# Patient Record
Sex: FEMALE | Race: WHITE | Employment: UNEMPLOYED | URBAN - METROPOLITAN AREA
[De-identification: names, ages, dates, MRNs, and addresses within clinical notes are randomized per-mention and may not be internally consistent; named-entity substitution may affect disease eponyms.]

---

## 2017-04-11 DIAGNOSIS — C50.419 MALIGNANT NEOPLASM OF UPPER-OUTER QUADRANT OF FEMALE BREAST, UNSPECIFIED LATERALITY: ICD-10-CM

## 2017-04-11 RX ORDER — VENLAFAXINE HYDROCHLORIDE 37.5 MG/1
37.5 CAPSULE, EXTENDED RELEASE ORAL DAILY
Qty: 90 CAPSULE | Refills: 3 | Status: SHIPPED | OUTPATIENT
Start: 2017-04-11 | End: 2017-04-24

## 2017-04-17 DIAGNOSIS — Z85.3 PERSONAL HISTORY OF MALIGNANT NEOPLASM OF BREAST: ICD-10-CM

## 2017-04-17 LAB
BASOPHILS # BLD AUTO: 0 10E9/L (ref 0–0.2)
BASOPHILS NFR BLD AUTO: 0 %
DIFFERENTIAL METHOD BLD: ABNORMAL
EOSINOPHIL # BLD AUTO: 0.2 10E9/L (ref 0–0.7)
EOSINOPHIL NFR BLD AUTO: 6 %
ERYTHROCYTE [DISTWIDTH] IN BLOOD BY AUTOMATED COUNT: 14 % (ref 10–15)
HCT VFR BLD AUTO: 39.2 % (ref 35–47)
HGB BLD-MCNC: 12.9 G/DL (ref 11.7–15.7)
LYMPHOCYTES # BLD AUTO: 0.9 10E9/L (ref 0.8–5.3)
LYMPHOCYTES NFR BLD AUTO: 24.4 %
MCH RBC QN AUTO: 30.1 PG (ref 26.5–33)
MCHC RBC AUTO-ENTMCNC: 32.9 G/DL (ref 31.5–36.5)
MCV RBC AUTO: 92 FL (ref 78–100)
MONOCYTES # BLD AUTO: 0.4 10E9/L (ref 0–1.3)
MONOCYTES NFR BLD AUTO: 10 %
NEUTROPHILS # BLD AUTO: 2.3 10E9/L (ref 1.6–8.3)
NEUTROPHILS NFR BLD AUTO: 59.6 %
PLATELET # BLD AUTO: 178 10E9/L (ref 150–450)
RBC # BLD AUTO: 4.28 10E12/L (ref 3.8–5.2)
WBC # BLD AUTO: 3.8 10E9/L (ref 4–11)

## 2017-04-17 PROCEDURE — 85025 COMPLETE CBC W/AUTO DIFF WBC: CPT | Performed by: INTERNAL MEDICINE

## 2017-04-17 PROCEDURE — 86300 IMMUNOASSAY TUMOR CA 15-3: CPT | Performed by: INTERNAL MEDICINE

## 2017-04-17 PROCEDURE — 36415 COLL VENOUS BLD VENIPUNCTURE: CPT | Performed by: INTERNAL MEDICINE

## 2017-04-17 PROCEDURE — 80076 HEPATIC FUNCTION PANEL: CPT | Performed by: INTERNAL MEDICINE

## 2017-04-18 LAB
ALBUMIN SERPL-MCNC: 4 G/DL (ref 3.4–5)
ALP SERPL-CCNC: 83 U/L (ref 40–150)
ALT SERPL W P-5'-P-CCNC: 40 U/L (ref 0–50)
AST SERPL W P-5'-P-CCNC: 25 U/L (ref 0–45)
BILIRUB DIRECT SERPL-MCNC: <0.1 MG/DL (ref 0–0.2)
BILIRUB SERPL-MCNC: 0.4 MG/DL (ref 0.2–1.3)
CANCER AG27-29 SERPL-ACNC: 22 U/ML (ref 0–39)
PROT SERPL-MCNC: 7.6 G/DL (ref 6.8–8.8)

## 2017-04-24 ENCOUNTER — ONCOLOGY VISIT (OUTPATIENT)
Dept: ONCOLOGY | Facility: CLINIC | Age: 47
End: 2017-04-24
Attending: INTERNAL MEDICINE
Payer: COMMERCIAL

## 2017-04-24 ENCOUNTER — MYC MEDICAL ADVICE (OUTPATIENT)
Dept: ONCOLOGY | Facility: CLINIC | Age: 47
End: 2017-04-24

## 2017-04-24 VITALS
HEIGHT: 65 IN | DIASTOLIC BLOOD PRESSURE: 75 MMHG | HEART RATE: 69 BPM | SYSTOLIC BLOOD PRESSURE: 134 MMHG | BODY MASS INDEX: 28.32 KG/M2 | TEMPERATURE: 97.3 F | OXYGEN SATURATION: 100 % | WEIGHT: 170 LBS

## 2017-04-24 DIAGNOSIS — Z85.3 PERSONAL HISTORY OF MALIGNANT NEOPLASM OF BREAST: ICD-10-CM

## 2017-04-24 DIAGNOSIS — D72.819 LEUKOPENIA, UNSPECIFIED TYPE: ICD-10-CM

## 2017-04-24 DIAGNOSIS — K76.0 FATTY LIVER: ICD-10-CM

## 2017-04-24 DIAGNOSIS — N95.1 HOT FLUSHES, PERIMENOPAUSAL: Primary | ICD-10-CM

## 2017-04-24 DIAGNOSIS — R42 DIZZINESS: ICD-10-CM

## 2017-04-24 DIAGNOSIS — C50.419 MALIGNANT NEOPLASM OF UPPER-OUTER QUADRANT OF FEMALE BREAST, UNSPECIFIED LATERALITY: ICD-10-CM

## 2017-04-24 PROCEDURE — 99215 OFFICE O/P EST HI 40 MIN: CPT | Performed by: INTERNAL MEDICINE

## 2017-04-24 PROCEDURE — 99211 OFF/OP EST MAY X REQ PHY/QHP: CPT

## 2017-04-24 RX ORDER — THYROID,PORK 15 MG
TABLET ORAL
Refills: 2 | COMMUNITY
Start: 2017-02-14 | End: 2018-10-15 | Stop reason: DRUGHIGH

## 2017-04-24 RX ORDER — ANASTROZOLE 1 MG/1
1 TABLET ORAL DAILY
Qty: 90 TABLET | Refills: 3 | Status: CANCELLED | OUTPATIENT
Start: 2017-04-24

## 2017-04-24 RX ORDER — VENLAFAXINE HYDROCHLORIDE 37.5 MG/1
75 CAPSULE, EXTENDED RELEASE ORAL DAILY
Qty: 60 CAPSULE | Refills: 1 | Status: SHIPPED | OUTPATIENT
Start: 2017-04-24 | End: 2017-10-16

## 2017-04-24 ASSESSMENT — PAIN SCALES - GENERAL: PAINLEVEL: NO PAIN (0)

## 2017-04-24 NOTE — PATIENT INSTRUCTIONS
Dr. Perry is referring you to ENT for your new onset of dizziness and recommends that you call us in 2 weeks regarding your symptoms and regarding scheduling a follow up. She also recommends that you complete your mammogram in July.     When you are in need of a refill, please call your pharmacy and they will send us a request.  Copy of appointments, and after visit summary (AVS) given to patient.  If you have any questions please call Sasha Gibson RN, BSN, OCN Oncology Hematology  [facility] [phone]. For questions after business hours, or on holidays/weekends, please call our after hours Nurse Triage line [phone]. Thank you.

## 2017-04-24 NOTE — TELEPHONE ENCOUNTER
Called pt regarding her MyChart question regarding stopping the effexor to see if her dizziness improves instead of increasing the dose. Told Pt that was ok per Dr. Perry but that she recommends that she wean off of it and instructed her to take it once a day for 3 days instead of daily before stopping it completely. Pt verbalized understanding and agreed with the plan.

## 2017-04-24 NOTE — MR AVS SNAPSHOT
After Visit Summary   4/24/2017    Lisset Lopes    MRN: [id number]           Patient Information     Date Of Birth          1970        Visit Information        Provider Department      4/24/2017 11:30 AM Clara Perry MD [facility]        Today's Diagnoses     Hot flushes, perimenopausal    -  1    Personal history of malignant neoplasm of breast        Fatty liver        Leukopenia, unspecified type        Dizziness        Malignant neoplasm of upper-outer quadrant of female breast, unspecified laterality (H)          Care Instructions    Dr. Perry is referring you to ENT for your new onset of dizziness and recommends that you call us in 2 weeks regarding your symptoms and regarding scheduling a follow up. She also recommends that you complete your mammogram in July.     When you are in need of a refill, please call your pharmacy and they will send us a request.  Copy of appointments, and after visit summary (AVS) given to patient.  If you have any questions please call Sasha Gibson RN, BSN, OCN Oncology Hematology  [facility] [phone]. For questions after business hours, or on holidays/weekends, please call our after hours Nurse Triage line [phone]. Thank you.           Follow-ups after your visit        Your next 10 appointments already scheduled     Jul 27, 2017 11:15 AM CDT   MA SCREENING BILATERAL W/ CINTHYA with [facility]   [facility] ([facility])    [address] [address] [phone]           Do not use any powder, lotion or deodorant under your arms or on your breast. If you do, we will ask you to remove it before your exam.  Wear comfortable, two-piece clothing.  If you have any allergies, tell your care team.  Bring any previous mammograms from other facilities or have them mailed to the breast center.              Future tests that were ordered for you today     Open Future  "Orders        Priority Expected Expires Ordered    MA Screen Bilateral w/Wes Routine 7/1/2017 4/24/2018 4/24/2017            Who to contact     If you have questions or need follow up information about today's clinic visit or your schedule please contact [facility] directly at [phone].  Normal or non-critical lab and imaging results will be communicated to you by MyChart, letter or phone within 4 business days after the clinic has received the results. If you do not hear from us within 7 days, please contact the clinic through [employer] or phone. If you have a critical or abnormal lab result, we will notify you by phone as soon as possible.  Submit refill requests through [employer] or call your pharmacy and they will forward the refill request to us. Please allow 3 business days for your refill to be completed.          Additional Information About Your Visit        MyChart Information     [employer] gives you secure access to your electronic health record. If you see a primary care provider, you can also send messages to your care team and make appointments. If you have questions, please call your primary care clinic.  If you do not have a primary care provider, please call [phone] and they will assist you.        Care EveryWhere ID     This is your Care EveryWhere ID. This could be used by other organizations to access your [place] medical records  [id number]        Your Vitals Were     Pulse Temperature Height Pulse Oximetry Breastfeeding? BMI (Body Mass Index)    69 97.3  F (36.3  C) (Tympanic) 1.657 m (5' 5.25\") 100% No 28.07 kg/m2       Blood Pressure from Last 3 Encounters:   04/24/17 134/75   10/10/16 130/89   04/07/16 118/70    Weight from Last 3 Encounters:   04/24/17 77.1 kg (170 lb)   10/10/16 73.1 kg (161 lb 1.6 oz)   04/07/16 71.5 kg (157 lb 9.6 oz)                 Today's Medication Changes          These changes are accurate as of: 4/24/17 12:09 PM.  If you have any questions, ask " your nurse or doctor.               These medicines have changed or have updated prescriptions.        Dose/Directions    venlafaxine 37.5 MG 24 hr capsule   Commonly known as:  EFFEXOR-XR   This may have changed:  how much to take   Used for:  Malignant neoplasm of upper-outer quadrant of female breast, unspecified laterality (H)   Changed by:  Clara Perry MD        Dose:  75 mg   Take 2 capsules (75 mg) by mouth daily   Quantity:  60 capsule   Refills:  1            Where to get your medicines      These medications were sent to [id number] - [place], [place] - [address]  [address] [place] [address]     Phone:  [phone]     venlafaxine 37.5 MG 24 hr capsule                Primary Care Provider Office Phone # Fax #    Estevan Reynoso [phone]       [facility] [address] [place] [address]        Thank you!     Thank you for choosing [facility]  for your care. Our goal is always to provide you with excellent care. Hearing back from our patients is one way we can continue to improve our services. Please take a few minutes to complete the written survey that you may receive in the mail after your visit with us. Thank you!             Your Updated Medication List - Protect others around you: Learn how to safely use, store and throw away your medicines at www.disposemymeds.org.          This list is accurate as of: 4/24/17 12:09 PM.  Always use your most recent med list.                   Brand Name Dispense Instructions for use    anastrozole 1 MG tablet    ARIMIDEX    90 tablet    Take 1 tablet (1 mg) by mouth daily       ARMOUR THYROID 15 MG Tabs tablet   Generic drug:  thyroid      TAKE 1 TABLET BY MOUTH ONE TIME DAILY ON AN EMPTY STOMACH       venlafaxine 37.5 MG 24 hr capsule    EFFEXOR-XR    60 capsule    Take 2 capsules (75 mg) by mouth daily

## 2017-04-24 NOTE — NURSING NOTE
"Lisset Lopes is a 46 year old female who presents for:  Chief Complaint   Patient presents with     Oncology Clinic Visit     6 month recheck Breast CA, review labs        Initial Vitals:  /75 (BP Location: Right arm, Patient Position: Chair, Cuff Size: Adult Regular)  Pulse 69  Temp 97.3  F (36.3  C) (Tympanic)  Ht 1.657 m (5' 5.25\")  Wt 77.1 kg (170 lb)  SpO2 100%  Breastfeeding? No  BMI 28.07 kg/m2 Estimated body mass index is 28.07 kg/(m^2) as calculated from the following:    Height as of this encounter: 1.657 m (5' 5.25\").    Weight as of this encounter: 77.1 kg (170 lb).. Body surface area is 1.88 meters squared. BP completed using cuff size: regular  No Pain (0) No LMP recorded. Patient is not currently having periods (Reason: Irregular Periods). Allergies and medications reviewed.     Medications: Medication refills not needed today.  Pharmacy name entered into EPIC: [id number] - [place], [place] - [address]    Comments: 6 month recheck Breast CA, review labs.     7  minutes for nursing intake (face to face time)   Jes Wright CMA        "

## 2017-04-24 NOTE — PROGRESS NOTES
CC: left breast cancer 2013 s/p lumpectomy, DD AC and wkly taxol, s/p RT     HISTORY OF PRESENT ILLNESS:  She presented at 43 MA 07/2013, identified heterogeneous breast and spiculated appearing mass + microcalcification.  She had a workup at [facility] initially.  Biopsy was done 08/2013, identified at 1 o'clock left breast, 2 cm from nipple fibroadenoma, 1 o'clock 10 cm from the nipple, invasive ductal cancer, grade 1, ER/MI 75% to 90% positive, HER-2/jose juan was negative, associated with DCIS, grade 1, without necrosis.    She subsequently went to [facility] to get surgical opinion from Dr. Kiran Kim and then went down to [facility].  Had a lumpectomy down there 10/03/2013, identified multifocal invasive ductal carcinoma with tubular features predominantly low nuclear grade 1.  The largest focus of invasive cancer 2.4 cm and second focus measured 1.4 cm, DCIS, low-grade, cribriform and micropapillary features, without necrosis.  Axillary lymph nodes, sentinel nodes #1 positive, the tumor focus measured 5 mm and has extranodal extension measures less than 1 mm.  Left axillary sentinel nodes #2 was negative.  Margin was negative.  She has pathologic T2 N1a invasive ductal cancer, multifocal disease, left breast cancer.   Staging PET has noise at surgical bed.   Oncotype RS is 22.     She made informed decision to proceed with DD AC then wkly taxol. C1D1 DD AC 11/26/2013. She went down to [place] and continued on the tx. She finished all the chemo in 4/2014.    She finished RT 7/2014. Tamoxifen is started then. LMP 12/2013. Tamoxifen is changed to Arimidex 4/2016.      PAST MEDICAL HISTORY:  Hypothyroidism off Synthroid due to poor tolerance.   PE in RLL and distal LLL in 1/2014 in [place] while on chemo s/p 6 months of coumadin till 7/2014.     FAMILY HISTORY:  Not significant for any breast cancer or any malignancy.      SOCIAL HISTORY:  She is .  She just opened a fitness center.  She has never  "been pregnant. Denies smoking.  She likes to drink, not every day.      REVIEW OF SYSTEMS:    Hot flushes did not response to clonidine, acupuncture., slightly better with Effexor 37.5 mg    More fatigue lately.   Reports new onset of dizziness in the last 3 wks, not position related. She claims she had ear problem before.     PHYSICAL EXAMINATION   VITAL SIGNS:  Blood pressure 134/75, pulse 69, temperature 97.3  F (36.3  C), temperature source Tympanic, height 1.657 m (5' 5.25\"), weight 77.1 kg (170 lb), SpO2 100 %, not currently breastfeeding.  GENERAL APPEARANCE:  A young woman, looks like her stated age, not in acute distress.     HEENT: The patient is normocephalic, atraumatic. Pupils are equal react to light.  Sclerae are anicteric.  Moist oral mucosa.  Negative pharynx.  No oral thrush.   NECK:  Supple.  No jugular venous distention.  Thyroid is not palpable.   LYMPH NODES:  Superficial lymphadenopathy is not appreciable in the bilateral cervical, supraclavicular, axillary or inguinal adenopathy.   CARDIOVASCULAR:  S1, S2 regular with no murmurs or gallops.  No carotid or abdominal bruits. Port site is clear.  PULMONARY:  Lungs are clear to auscultation and percussion bilaterally.  There is no wheezing or rhonchi.   GASTROINTESTINAL:  Abdomen is soft, nontender.  No hepatosplenomegaly.  No signs of ascites.  No mass appreciable.   MUSCULOSKELETAL/EXTREMITIES:  No edema.  No cyanotic changes.  No signs of joint deformity.  No lymphedema.   NEUROLOGIC:  Cranial nerves II-XII are grossly intact.  Sensation intact.  Muscle strength and muscle tone symmetrical all through 5/5.   BACK:  No spinal or paraspinal tenderness.  No CVA tenderness.   SKIN:  No petechiae.  No rash.  No signs of cellulitis.   BREASTS:  Left breast has post RT changes. Scars are smooth.  Left nipple is retracted.  Right breast revealed no skin changes, no palpable lesions. She has chronic bilateral nipple inversion.       CURRENT LAB DATA:  "   WBC 3.8 from  3.2 from 3.4 from 2.8, anc nl, LFT/marker are good.      CURRENT IMAGE  MA dx b/l 7/2016: negative    Old data reviewed with summary  CT BODY 4/2015:  1. No evidence of mets  2. sclerotic foci are unchanged dating back to 11/12/2013 which is supportive evidence for a benign entity.  3. Stable AMEYA pulmonary nodule stable, stable liver lesion and left adrenal changes consistent with benign etiology.   4. Fatty liver    CT 7/2014 - clearing of PE.  CT in [place] 1/2014 - PE in RLL and more distal LLL with subsegmental atelectasis, ? Pulmonary infarct.    PET 4/2014 - negative for activity.     MUGA 11/20-13 - EF 71%.   Thyroid sono 11/2013 - There is asymmetric enlargement of the right lobe of the gland which is heterogeneous and hyperemic. This can relate to an asymmetric goiter and/or thyroiditis. This accounts for the somewhat diffuse uptake in the right lobe of the thyroid gland on the recent PET/CT. More specific attention to the upper pole of the right lobe where slightly more focal activity is seen on the PET/CT shows no discrete nodule. There is an incidental small nodule in the lower pole of the right lobe of the thyroid gland with no convincing separate standout uptake on the PET/CT.     Oncotype dx RS 22, correspond to 10 yr distal rate of recurrence of 14% if only Tamoxifen , RT.    BRCA1 and BRCA2 test was negative from [facility] in 09/2013.       ASSESSMENT AND PLAN:    1. pathologic T2 N1a left breast cancer dx in premenopausally at age 43 years old in 2013.  She had T2N1a stage II disease. RS at 22.      She was offered DD AC with wkly or DD taxol. She made informed decision to proceed with DD AC and wkly taxol from 11/2013 to 4/2014.    She finished RT 7/2014. And started on Tamoxifen summer 2014. LMP 12/2013.  Tamoxifen is started then. LMP 12/2013. Tamoxifen is changed to Arimidex 4/2016.     I shared with her the 10 yr data of anti hormone thearpy. She will benefit from  longer anti hormone therapy.      She is due 6 months f/u with labs.  MA 3D is due in July.     2. Hot flushes. She tried clodinine patch, it did not work. Acupuncture did not work either.   Effexor 37.5 mg seems to be helpful. Yet this drug has s/e listed as 25% dizziness.   If by increasing the dose, her new dizziness is worse, then likely is the mde side effects.    Or she has to hold it and bare more hot flushes and see if that improves her dizziness.     3. Leukopenia. It is likely from previous chemo.  This needs on going monitoring. It is improving.     4. new onset of dizziness in the last 3 wks, not position related. She claims she had ear problem before.   Effexor has this side effects up to 25%, Arimidex has it too up to 8%.  Recommned brain MRI as work up, she declines it for now. She would like to see ENT first locally.

## 2017-05-11 ENCOUNTER — TELEPHONE (OUTPATIENT)
Dept: ONCOLOGY | Facility: CLINIC | Age: 47
End: 2017-05-11

## 2017-05-11 DIAGNOSIS — C50.419 MALIGNANT NEOPLASM OF UPPER-OUTER QUADRANT OF FEMALE BREAST (H): Primary | ICD-10-CM

## 2017-05-11 NOTE — TELEPHONE ENCOUNTER
Called and left  for Pt to return my call for a status check regarding her symptoms discussed in Clinic recently with Dr. Perry. Left my direct line and waiting on call back.

## 2017-05-16 NOTE — TELEPHONE ENCOUNTER
Pt returned my call stating that she stopped her effexor as advised and was recently seen and evaluated by her local ENT doctor in [facility] by the name of Dr. Abhi Duff. Per Pt, Dr. Duff was unable to determine what was causing her dizziness and attributed it to vertigo stating that it was up to Dr. Perry if she wanted to do any further work up.     Per pt her dizziness has subsided since seeing us last and stopping the effexor. Pt is now wondering if Dr. Perry has any further recommendations or if she would just like to see her at her routine f/u. Told Pt that I would speak to Dr. Perry regarding this and get back to her within the next few days. Pt verbalized understanding and agreed with the plan.

## 2017-05-16 NOTE — TELEPHONE ENCOUNTER
Called pt back to let her know that Dr. Perry recommends that she f/u in 6 months with labs prior. Also informed her per Dr. Perry that if the effexor was helping her hot flashes that she could try to take one 37.5 mg tablet every other day to see if it helps without causing dizziness or advised that she try apple cider vinegar. Pt states that she prefers not to take the effexor at this time but might resume the apple cider vinegar which she stated that she has tried in the past. Pt verbalized understanding and call transferred to the  to schedule her appts.

## 2017-07-27 ENCOUNTER — HOSPITAL ENCOUNTER (OUTPATIENT)
Dept: MAMMOGRAPHY | Facility: CLINIC | Age: 47
Discharge: HOME | End: 2017-07-27
Attending: INTERNAL MEDICINE | Admitting: INTERNAL MEDICINE
Payer: COMMERCIAL

## 2017-07-27 DIAGNOSIS — Z85.3 PERSONAL HISTORY OF MALIGNANT NEOPLASM OF BREAST: ICD-10-CM

## 2017-07-27 PROCEDURE — 77063 BREAST TOMOSYNTHESIS BI: CPT

## 2017-07-27 PROCEDURE — G0202 SCR MAMMO BI INCL CAD: HCPCS

## 2017-10-09 DIAGNOSIS — C50.419 MALIGNANT NEOPLASM OF UPPER-OUTER QUADRANT OF FEMALE BREAST (H): ICD-10-CM

## 2017-10-09 LAB
BASOPHILS # BLD AUTO: 0 10E9/L (ref 0–0.2)
BASOPHILS NFR BLD AUTO: 0.2 %
DIFFERENTIAL METHOD BLD: NORMAL
EOSINOPHIL # BLD AUTO: 0.1 10E9/L (ref 0–0.7)
EOSINOPHIL NFR BLD AUTO: 1.6 %
ERYTHROCYTE [DISTWIDTH] IN BLOOD BY AUTOMATED COUNT: 14 % (ref 10–15)
HCT VFR BLD AUTO: 41.6 % (ref 35–47)
HGB BLD-MCNC: 13.7 G/DL (ref 11.7–15.7)
LYMPHOCYTES # BLD AUTO: 1 10E9/L (ref 0.8–5.3)
LYMPHOCYTES NFR BLD AUTO: 23.9 %
MCH RBC QN AUTO: 29.8 PG (ref 26.5–33)
MCHC RBC AUTO-ENTMCNC: 32.9 G/DL (ref 31.5–36.5)
MCV RBC AUTO: 91 FL (ref 78–100)
MONOCYTES # BLD AUTO: 0.4 10E9/L (ref 0–1.3)
MONOCYTES NFR BLD AUTO: 10.1 %
NEUTROPHILS # BLD AUTO: 2.7 10E9/L (ref 1.6–8.3)
NEUTROPHILS NFR BLD AUTO: 64.2 %
PLATELET # BLD AUTO: 212 10E9/L (ref 150–450)
RBC # BLD AUTO: 4.59 10E12/L (ref 3.8–5.2)
WBC # BLD AUTO: 4.3 10E9/L (ref 4–11)

## 2017-10-09 PROCEDURE — 80076 HEPATIC FUNCTION PANEL: CPT | Performed by: INTERNAL MEDICINE

## 2017-10-09 PROCEDURE — 85025 COMPLETE CBC W/AUTO DIFF WBC: CPT | Performed by: INTERNAL MEDICINE

## 2017-10-09 PROCEDURE — 86300 IMMUNOASSAY TUMOR CA 15-3: CPT | Performed by: INTERNAL MEDICINE

## 2017-10-09 PROCEDURE — 36415 COLL VENOUS BLD VENIPUNCTURE: CPT | Performed by: INTERNAL MEDICINE

## 2017-10-10 LAB
ALBUMIN SERPL-MCNC: 4 G/DL (ref 3.4–5)
ALP SERPL-CCNC: 91 U/L (ref 40–150)
ALT SERPL W P-5'-P-CCNC: 38 U/L (ref 0–50)
AST SERPL W P-5'-P-CCNC: 18 U/L (ref 0–45)
BILIRUB DIRECT SERPL-MCNC: <0.1 MG/DL (ref 0–0.2)
BILIRUB SERPL-MCNC: 0.6 MG/DL (ref 0.2–1.3)
CANCER AG27-29 SERPL-ACNC: 19 U/ML (ref 0–39)
PROT SERPL-MCNC: 7.4 G/DL (ref 6.8–8.8)

## 2017-10-16 ENCOUNTER — ONCOLOGY VISIT (OUTPATIENT)
Dept: ONCOLOGY | Facility: CLINIC | Age: 47
End: 2017-10-16
Attending: INTERNAL MEDICINE
Payer: COMMERCIAL

## 2017-10-16 VITALS
DIASTOLIC BLOOD PRESSURE: 85 MMHG | WEIGHT: 170.4 LBS | TEMPERATURE: 97.8 F | SYSTOLIC BLOOD PRESSURE: 145 MMHG | HEIGHT: 65 IN | RESPIRATION RATE: 14 BRPM | HEART RATE: 76 BPM | BODY MASS INDEX: 28.39 KG/M2

## 2017-10-16 DIAGNOSIS — Z17.0 MALIGNANT NEOPLASM OF UPPER-OUTER QUADRANT OF LEFT BREAST IN FEMALE, ESTROGEN RECEPTOR POSITIVE (H): Primary | ICD-10-CM

## 2017-10-16 DIAGNOSIS — N95.1 HOT FLUSHES, PERIMENOPAUSAL: ICD-10-CM

## 2017-10-16 DIAGNOSIS — M85.80 OSTEOPENIA, UNSPECIFIED LOCATION: ICD-10-CM

## 2017-10-16 DIAGNOSIS — Z78.0 MENOPAUSE: ICD-10-CM

## 2017-10-16 DIAGNOSIS — C50.412 MALIGNANT NEOPLASM OF UPPER-OUTER QUADRANT OF LEFT BREAST IN FEMALE, ESTROGEN RECEPTOR POSITIVE (H): Primary | ICD-10-CM

## 2017-10-16 PROCEDURE — 99211 OFF/OP EST MAY X REQ PHY/QHP: CPT

## 2017-10-16 PROCEDURE — 99214 OFFICE O/P EST MOD 30 MIN: CPT | Performed by: INTERNAL MEDICINE

## 2017-10-16 RX ORDER — THYROID 60 MG
60 TABLET ORAL DAILY
Refills: 2 | COMMUNITY
Start: 2017-09-20

## 2017-10-16 ASSESSMENT — PAIN SCALES - GENERAL: PAINLEVEL: NO PAIN (0)

## 2017-10-16 NOTE — PROGRESS NOTES
CC: left breast cancer 2013 s/p lumpectomy, DD AC and wkly taxol, s/p RT     HISTORY OF PRESENT ILLNESS:  She presented at 43 MA 07/2013, identified heterogeneous breast and spiculated appearing mass + microcalcification.  She had a workup at [facility] initially.  Biopsy was done 08/2013, identified at 1 o'clock left breast, 2 cm from nipple fibroadenoma, 1 o'clock 10 cm from the nipple, invasive ductal cancer, grade 1, ER/DC 75% to 90% positive, HER-2/jose juan was negative, associated with DCIS, grade 1, without necrosis.    She subsequently went to [facility] to get surgical opinion from Dr. Kiran Kim and then went down to [facility].  Had a lumpectomy down there 10/03/2013, identified multifocal invasive ductal carcinoma with tubular features predominantly low nuclear grade 1.  The largest focus of invasive cancer 2.4 cm and second focus measured 1.4 cm, DCIS, low-grade, cribriform and micropapillary features, without necrosis.  Axillary lymph nodes, sentinel nodes #1 positive, the tumor focus measured 5 mm and has extranodal extension measures less than 1 mm.  Left axillary sentinel nodes #2 was negative.  Margin was negative.  She has pathologic T2 N1a invasive ductal cancer, multifocal disease, left breast cancer.   Staging PET has noise at surgical bed.   Oncotype RS is 22.     She made informed decision to proceed with DD AC then wkly taxol. C1D1 DD AC 11/26/2013. She went down to [place] and continued on the tx. She finished all the chemo in 4/2014.    She finished RT 7/2014. Tamoxifen is started then. LMP 12/2013. Tamoxifen is changed to Arimidex 4/2016.      PAST MEDICAL HISTORY:  Hypothyroidism off Synthroid due to poor tolerance.   PE in RLL and distal LLL in 1/2014 in [place] while on chemo s/p 6 months of coumadin till 7/2014.     FAMILY HISTORY:  Not significant for any breast cancer or any malignancy.      SOCIAL HISTORY:  She is .  She just opened a fitness center.  She has never  "been pregnant. Denies smoking.  She likes to drink, not every day.      REVIEW OF SYSTEMS:    Still has hot flushes. Dizziness is gone after stopping Effexor.   She had 3 episodes of rash in summer.   She is tolerating AI ok.       PHYSICAL EXAMINATION   VITAL SIGNS:  Blood pressure 145/85, pulse 76, temperature 97.8  F (36.6  C), temperature source Tympanic, resp. rate 14, height 1.651 m (5' 5\"), weight 77.3 kg (170 lb 6.4 oz), not currently breastfeeding.  GENERAL APPEARANCE:  A young woman, looks like her stated age, not in acute distress.     HEENT: The patient is normocephalic, atraumatic. Pupils are equal react to light.  Sclerae are anicteric.  Moist oral mucosa.  Negative pharynx.  No oral thrush.   NECK:  Supple.  No jugular venous distention.  Thyroid is not palpable.   LYMPH NODES:  Superficial lymphadenopathy is not appreciable in the bilateral cervical, supraclavicular, axillary or inguinal adenopathy.   CARDIOVASCULAR:  S1, S2 regular with no murmurs or gallops.  No carotid or abdominal bruits. Port site is clear.  PULMONARY:  Lungs are clear to auscultation and percussion bilaterally.  There is no wheezing or rhonchi.   GASTROINTESTINAL:  Abdomen is soft, nontender.  No hepatosplenomegaly.  No signs of ascites.  No mass appreciable.   MUSCULOSKELETAL/EXTREMITIES:  No edema.  No cyanotic changes.  No signs of joint deformity.  No lymphedema.   NEUROLOGIC:  Cranial nerves II-XII are grossly intact.  Sensation intact.  Muscle strength and muscle tone symmetrical all through 5/5.   BACK:  No spinal or paraspinal tenderness.  No CVA tenderness.   SKIN:  No petechiae.  No rash.  No signs of cellulitis.   BREASTS:  Left breast has post RT changes. Scars are smooth.  Left nipple is retracted.  Right breast revealed no skin changes, no palpable lesions. She has chronic bilateral nipple inversion.       CURRENT LAB DATAREVIWED   WBC NL from 3.8 from  3.2 from 3.4 from 2.8, anc nl,   LFT/marker are good.  "     CURRENT IMAGE REVIEWED  MA dx b/l 7/2017: negative    Old data reviewed with summary  CT BODY 4/2015:  1. No evidence of mets  2. sclerotic foci are unchanged dating back to 11/12/2013 which is supportive evidence for a benign entity.  3. Stable AMEYA pulmonary nodule stable, stable liver lesion and left adrenal changes consistent with benign etiology.   4. Fatty liver    CT 7/2014 - clearing of PE.  CT in [place] 1/2014 - PE in RLL and more distal LLL with subsegmental atelectasis, ? Pulmonary infarct.    PET 4/2014 - negative for activity.     MUGA 11/20-13 - EF 71%.   Thyroid sono 11/2013 - There is asymmetric enlargement of the right lobe of the gland which is heterogeneous and hyperemic. This can relate to an asymmetric goiter and/or thyroiditis. This accounts for the somewhat diffuse uptake in the right lobe of the thyroid gland on the recent PET/CT. More specific attention to the upper pole of the right lobe where slightly more focal activity is seen on the PET/CT shows no discrete nodule. There is an incidental small nodule in the lower pole of the right lobe of the thyroid gland with no convincing separate standout uptake on the PET/CT.     Oncotype dx RS 22, correspond to 10 yr distal rate of recurrence of 14% if only Tamoxifen , RT.    BRCA1 and BRCA2 test was negative from [facility] in 09/2013.       ASSESSMENT AND PLAN:    1. pathologic T2 N1a left breast cancer dx in premenopausally at age 43 years old in 2013.  She had T2N1a stage II disease. RS at 22.      She was offered DD AC then  taxol from 11/2013 to 4/2014.    She finished RT 7/2014.   She started on Tamoxifen summer 2014. LMP 12/2013.  Tamoxifen was changed to Arimidex 4/2016.     I shared with her the 10 yr data of anti hormone thearpy. She will benefit from longer anti hormone therapy.      She is due 6 months f/u with labs.  MA 3D is due in July.       2. Hot flushes. She tried clodinine patch, it did not work. Acupuncture did  "not work either.   Effexor 37.5 mg seemed to be helpful.   Yet gave her dizziness. So it is dropped.  Behavior therapy is advised.     3. She reported had Dexa last fall with [facility] and read as \"osteopenia\".  Need to obtain that record. And repeat Dexa now.    She is advised on proper vit D dose.          "

## 2017-10-16 NOTE — NURSING NOTE
"Oncology Rooming Note    October 16, 2017 11:21 AM   Lisset Lopes is a 47 year old female who presents for:    Chief Complaint   Patient presents with     Oncology Clinic Visit     f/u breast cancer, and review labs and mammogram     Initial Vitals: /85 (BP Location: Right arm, Patient Position: Chair, Cuff Size: Adult Regular)  Pulse 76  Temp 97.8  F (36.6  C) (Tympanic)  Resp 14  Ht 1.651 m (5' 5\")  Wt 77.3 kg (170 lb 6.4 oz)  BMI 28.36 kg/m2 Estimated body mass index is 28.36 kg/(m^2) as calculated from the following:    Height as of this encounter: 1.651 m (5' 5\").    Weight as of this encounter: 77.3 kg (170 lb 6.4 oz). Body surface area is 1.88 meters squared.  No Pain (0) Comment: Data Unavailable   No LMP recorded. Patient is not currently having periods (Reason: Irregular Periods).  Allergies reviewed: Yes  Medications reviewed: Yes    Medications: Medication refills not needed today.  Pharmacy name entered into EPIC: [id number] [address]    Clinical concerns: Patient presents today in f/u of breast cancer and to review labs and mammogram.  Does have pain in right axilla area when she coughs that started about a week-12 days ago and also a swollen right eyelid/orbit that started on Friday.  No other c/o. Dr. Perry was notified.    7 minutes for nursing intake (face to face time)     Mela Kim RN              "

## 2017-10-16 NOTE — PATIENT INSTRUCTIONS
Dr. Perry would like you to have a dexa scan. We would like to see you back in 6 months for a follow up appointment with labs prior. When you are in need of a refill, please call your pharmacy and they will send us a request.  Copy of appointments, and after visit summary (AVS) given to patient.  If you have any questions please call Sonia Wild RN, BSN Oncology Hematology  [facility] [phone]. For questions after business hours, or on holidays/weekends, please call our after hours Nurse Triage line [phone]. Thank you.             Obtain dexa result from [facility] fall 2016.  Try dexa now.   6 months f/u with labs.

## 2017-12-03 ENCOUNTER — HEALTH MAINTENANCE LETTER (OUTPATIENT)
Age: 47
End: 2017-12-03

## 2018-01-23 DIAGNOSIS — Z85.3 PERSONAL HISTORY OF MALIGNANT NEOPLASM OF BREAST: ICD-10-CM

## 2018-01-23 RX ORDER — ANASTROZOLE 1 MG/1
1 TABLET ORAL DAILY
Qty: 90 TABLET | Refills: 3 | Status: SHIPPED | OUTPATIENT
Start: 2018-01-23 | End: 2018-10-15 | Stop reason: ALTCHOICE

## 2018-01-23 NOTE — PROGRESS NOTES
Fax received from [facility] pharmacy requesting a refill of Arimidex on behalf of pt.  Last refill: 4/17/2016  # 90 with 3 refills at same as above.  Last office visit:  10/16/2017  Next office visit:  4/16/2018    This is an appropriate refill, and has been e-prescribed. Sonia Wild, RN, BSN, OCN

## 2018-04-09 DIAGNOSIS — Z78.0 MENOPAUSE: ICD-10-CM

## 2018-04-09 DIAGNOSIS — Z17.0 MALIGNANT NEOPLASM OF UPPER-OUTER QUADRANT OF LEFT BREAST IN FEMALE, ESTROGEN RECEPTOR POSITIVE (H): ICD-10-CM

## 2018-04-09 DIAGNOSIS — C50.412 MALIGNANT NEOPLASM OF UPPER-OUTER QUADRANT OF LEFT BREAST IN FEMALE, ESTROGEN RECEPTOR POSITIVE (H): ICD-10-CM

## 2018-04-09 DIAGNOSIS — N95.1 HOT FLUSHES, PERIMENOPAUSAL: ICD-10-CM

## 2018-04-09 DIAGNOSIS — M85.80 OSTEOPENIA, UNSPECIFIED LOCATION: ICD-10-CM

## 2018-04-09 LAB
ALBUMIN SERPL-MCNC: 3.8 G/DL (ref 3.4–5)
ALP SERPL-CCNC: 139 U/L (ref 40–150)
ALT SERPL W P-5'-P-CCNC: 134 U/L (ref 0–50)
AST SERPL W P-5'-P-CCNC: 56 U/L (ref 0–45)
BASOPHILS # BLD AUTO: 0 10E9/L (ref 0–0.2)
BASOPHILS NFR BLD AUTO: 0.1 %
BILIRUB DIRECT SERPL-MCNC: 0.1 MG/DL (ref 0–0.2)
BILIRUB SERPL-MCNC: 0.9 MG/DL (ref 0.2–1.3)
CANCER AG27-29 SERPL-ACNC: 22 U/ML (ref 0–39)
DIFFERENTIAL METHOD BLD: NORMAL
EOSINOPHIL # BLD AUTO: 0.1 10E9/L (ref 0–0.7)
EOSINOPHIL NFR BLD AUTO: 1.5 %
ERYTHROCYTE [DISTWIDTH] IN BLOOD BY AUTOMATED COUNT: 14.2 % (ref 10–15)
HCT VFR BLD AUTO: 42.1 % (ref 35–47)
HGB BLD-MCNC: 13.6 G/DL (ref 11.7–15.7)
LYMPHOCYTES # BLD AUTO: 1.5 10E9/L (ref 0.8–5.3)
LYMPHOCYTES NFR BLD AUTO: 21 %
MCH RBC QN AUTO: 29.6 PG (ref 26.5–33)
MCHC RBC AUTO-ENTMCNC: 32.3 G/DL (ref 31.5–36.5)
MCV RBC AUTO: 92 FL (ref 78–100)
MONOCYTES # BLD AUTO: 0.7 10E9/L (ref 0–1.3)
MONOCYTES NFR BLD AUTO: 9.2 %
NEUTROPHILS # BLD AUTO: 4.9 10E9/L (ref 1.6–8.3)
NEUTROPHILS NFR BLD AUTO: 68.2 %
PLATELET # BLD AUTO: 239 10E9/L (ref 150–450)
PROT SERPL-MCNC: 7.7 G/DL (ref 6.8–8.8)
RBC # BLD AUTO: 4.59 10E12/L (ref 3.8–5.2)
WBC # BLD AUTO: 7.2 10E9/L (ref 4–11)

## 2018-04-09 PROCEDURE — 36415 COLL VENOUS BLD VENIPUNCTURE: CPT | Performed by: INTERNAL MEDICINE

## 2018-04-09 PROCEDURE — 85025 COMPLETE CBC W/AUTO DIFF WBC: CPT | Performed by: INTERNAL MEDICINE

## 2018-04-09 PROCEDURE — 80076 HEPATIC FUNCTION PANEL: CPT | Performed by: INTERNAL MEDICINE

## 2018-04-09 PROCEDURE — 86300 IMMUNOASSAY TUMOR CA 15-3: CPT | Performed by: INTERNAL MEDICINE

## 2018-04-16 ENCOUNTER — ONCOLOGY VISIT (OUTPATIENT)
Dept: ONCOLOGY | Facility: CLINIC | Age: 48
End: 2018-04-16
Attending: INTERNAL MEDICINE
Payer: COMMERCIAL

## 2018-04-16 VITALS
RESPIRATION RATE: 16 BRPM | HEIGHT: 65 IN | BODY MASS INDEX: 28.56 KG/M2 | OXYGEN SATURATION: 97 % | WEIGHT: 171.4 LBS | HEART RATE: 76 BPM | DIASTOLIC BLOOD PRESSURE: 89 MMHG | TEMPERATURE: 98.9 F | SYSTOLIC BLOOD PRESSURE: 142 MMHG

## 2018-04-16 DIAGNOSIS — Z85.3 PERSONAL HISTORY OF MALIGNANT NEOPLASM OF BREAST: ICD-10-CM

## 2018-04-16 DIAGNOSIS — R79.89 ELEVATED LFTS: Primary | ICD-10-CM

## 2018-04-16 PROCEDURE — 99214 OFFICE O/P EST MOD 30 MIN: CPT | Performed by: INTERNAL MEDICINE

## 2018-04-16 PROCEDURE — G0463 HOSPITAL OUTPT CLINIC VISIT: HCPCS

## 2018-04-16 RX ORDER — ANASTROZOLE 1 MG/1
1 TABLET ORAL DAILY
Qty: 90 TABLET | Refills: 3 | Status: CANCELLED | OUTPATIENT
Start: 2018-04-16

## 2018-04-16 ASSESSMENT — PAIN SCALES - GENERAL: PAINLEVEL: MILD PAIN (2)

## 2018-04-16 NOTE — PATIENT INSTRUCTIONS
Dr. Perry would like you to repeat labs (LFT) in May and we will call you with results. We also would like you to have a Mammogram in July.     We would like to see you back in 6 months for a follow up appointment with labs prior.     When you are in need of a refill, please call your pharmacy and they will send us a request.      Copy of appointments, and after visit summary (AVS) given to patient.      If you have any questions please call Sonia Wild RN, BSN Oncology Hematology  [facility] [phone]. For questions after business hours, or on holidays/weekends, please call our after hours Nurse Triage line [phone]. Thank you.                 Repeat LFT in May, will call pt on result.   Alberto MARTINS in July. 6 months f/u with labs.

## 2018-04-16 NOTE — PROGRESS NOTES
CC: left breast cancer 2013 s/p lumpectomy, DD AC and wkly taxol, s/p RT, on AI now.l      HISTORY OF PRESENT ILLNESS:  She presented at 43 MA 07/2013, identified heterogeneous breast and spiculated appearing mass + microcalcification.  She had a workup at [facility] initially.  Biopsy was done 08/2013, identified at 1 o'clock left breast, 2 cm from nipple fibroadenoma, 1 o'clock 10 cm from the nipple, invasive ductal cancer, grade 1, ER/CT 75% to 90% positive, HER-2/jose juan was negative, associated with DCIS, grade 1, without necrosis.    She subsequently went to [facility] to get surgical opinion from Dr. Kiran Kim and then went down to [facility].  Had a lumpectomy down there 10/03/2013, identified multifocal invasive ductal carcinoma with tubular features predominantly low nuclear grade 1.  The largest focus of invasive cancer 2.4 cm and second focus measured 1.4 cm, DCIS, low-grade, cribriform and micropapillary features, without necrosis.  Axillary lymph nodes, sentinel nodes #1 positive, the tumor focus measured 5 mm and has extranodal extension measures less than 1 mm.  Left axillary sentinel nodes #2 was negative.  Margin was negative.  She has pathologic T2 N1a invasive ductal cancer, multifocal disease, left breast cancer.   Staging PET has noise at surgical bed.   Oncotype RS is 22.     She made informed decision to proceed with DD AC then wkly taxol. C1D1 DD AC 11/26/2013. She went down to [place] and continued on the tx. She finished all the chemo in 4/2014.    She finished RT 7/2014. Tamoxifen is started then. LMP 12/2013. Tamoxifen is changed to Arimidex 4/2016.      PAST MEDICAL HISTORY:  Hypothyroidism off Synthroid due to poor tolerance. PE in RLL and distal LLL in 1/2014 in [place] while on chemo s/p 6 months of coumadin till 7/2014.     FAMILY HISTORY:  Not significant for any breast cancer or any malignancy.      SOCIAL HISTORY:  She is .  She just opened a fitness center.  She  "has never been pregnant. Denies smoking.  She likes to drink, not every day.      REVIEW OF SYSTEMS:    Still has hot flushes.   She is tolerating AI ok.   She just got over from an URI.       PHYSICAL EXAMINATION   VITAL SIGNS:  Blood pressure 142/89, pulse 76, temperature 98.9  F (37.2  C), temperature source Tympanic, resp. rate 16, height 1.651 m (5' 5\"), weight 77.7 kg (171 lb 6.4 oz), SpO2 97 %, not currently breastfeeding.  GENERAL APPEARANCE:  A young woman, looks like her stated age, not in acute distress.     HEENT: The patient is normocephalic, atraumatic. Pupils are equal react to light.  Sclerae are anicteric.  Moist oral mucosa.  Negative pharynx.  No oral thrush.   NECK:  Supple.  No jugular venous distention.  Thyroid is not palpable.   LYMPH NODES:  Superficial lymphadenopathy is not appreciable in the bilateral cervical, supraclavicular, axillary or inguinal adenopathy.   CARDIOVASCULAR:  S1, S2 regular with no murmurs or gallops.  No carotid or abdominal bruits. Port site is clear.  PULMONARY:  Lungs are clear to auscultation and percussion bilaterally.  There is no wheezing or rhonchi.   GASTROINTESTINAL:  Abdomen is soft, nontender.  No hepatosplenomegaly.  No signs of ascites.  No mass appreciable.   MUSCULOSKELETAL/EXTREMITIES:  No edema.  No cyanotic changes.  No signs of joint deformity.  No lymphedema.   NEUROLOGIC:  Cranial nerves II-XII are grossly intact.  Sensation intact.  Muscle strength and muscle tone symmetrical all through 5/5.   BACK:  No spinal or paraspinal tenderness.  No CVA tenderness.   SKIN:  No petechiae.  No rash.  No signs of cellulitis.   BREASTS:  Left breast has post RT changes. Scars are smooth.  chronic bilateral nipples retraction.  Right breast revealed no skin changes, no palpable lesions. She has dense breasts on both sides, left is more than right.       CURRENT LAB DATAREVIWED   WBC nl from 3.8 from  3.2 from 3.4 from 2.8, anc nl,   marker is good.  AST/ALT " are elevated in 4/2018    CURRENT IMAGE REVIEWED  Dexa from 12/2017 outside - osteopenia.  MA dx b/l 7/2017: negative    Old data reviewed with summary  CT BODY 4/2015:  1. No evidence of mets  2. sclerotic foci are unchanged dating back to 11/12/2013 which is supportive evidence for a benign entity.  3. Stable AMEYA pulmonary nodule stable, stable liver lesion and left adrenal changes consistent with benign etiology.   4. Fatty liver    CT 7/2014 - clearing of PE.  CT in [place] 1/2014 - PE in RLL and more distal LLL with subsegmental atelectasis, ? Pulmonary infarct.    PET 4/2014 - negative for activity.     MUGA 11/20-13 - EF 71%.   Thyroid sono 11/2013 - There is asymmetric enlargement of the right lobe of the gland which is heterogeneous and hyperemic. This can relate to an asymmetric goiter and/or thyroiditis. This accounts for the somewhat diffuse uptake in the right lobe of the thyroid gland on the recent PET/CT. More specific attention to the upper pole of the right lobe where slightly more focal activity is seen on the PET/CT shows no discrete nodule. There is an incidental small nodule in the lower pole of the right lobe of the thyroid gland with no convincing separate standout uptake on the PET/CT.     Oncotype dx RS 22, correspond to 10 yr distal rate of recurrence of 14% if only Tamoxifen , RT.    BRCA1 and BRCA2 test was negative from [facility] in 09/2013.       ASSESSMENT AND PLAN:    1. pathologic T2 N1a left breast cancer dx in premenopausally at age 43 years old in 2013.  She had T2N1a stage II disease. RS at 22.      She was offered DD AC then  taxol from 11/2013 to 4/2014.    She finished RT 7/2014.   She started on Tamoxifen summer 2014. LMP 12/2013.  Tamoxifen was changed to Arimidex 4/2016.     I shared with her the 10 yr data of anti hormone thearpy. She will benefit from longer anti hormone therapy.      She is due 6 months f/u with labs.  MA 3D is due in July.       2. Hot  flushes. She tried clodinine patch, it did not work. Acupuncture did not work either.   Effexor 37.5 mg seemed to be helpful.     3. Osteopenia - advice vit D and Ca and out door activity.     4. Elevated LFT - likely from OCT cold medicine she took. She is over URI now, would repeat LFT in May.

## 2018-04-16 NOTE — NURSING NOTE
"Oncology Rooming Note    April 16, 2018 11:32 AM   Lisset Lopes is a 47 year old female who presents for:    Chief Complaint   Patient presents with     Oncology Clinic Visit     6 month recheck Breast CA, review Labs      Initial Vitals: /89 (BP Location: Right arm, Patient Position: Sitting, Cuff Size: Adult Regular)  Pulse 76  Temp 98.9  F (37.2  C) (Tympanic)  Resp 16  Ht 1.651 m (5' 5\")  Wt 77.7 kg (171 lb 6.4 oz)  SpO2 97%  Breastfeeding? No  BMI 28.52 kg/m2 Estimated body mass index is 28.52 kg/(m^2) as calculated from the following:    Height as of this encounter: 1.651 m (5' 5\").    Weight as of this encounter: 77.7 kg (171 lb 6.4 oz). Body surface area is 1.89 meters squared.  Mild Pain (2) Comment: Data Unavailable   No LMP recorded. Patient is not currently having periods (Reason: Irregular Periods).  Allergies reviewed: Yes  Medications reviewed: Yes    Medications: MEDICATION REFILLS NEEDED TODAY. Provider was notified.  Pharmacy name entered into EPIC: [id number] - [place], [place] [facility] [address]    Clinical concerns:  6 month recheck Breast CA, review Labs.     7 minutes for nursing intake (face to face time)     Jes Wright CMA              "

## 2018-04-16 NOTE — MR AVS SNAPSHOT
After Visit Summary   4/16/2018    Lisset Lopes    MRN: [id number]           Patient Information     Date Of Birth          1970        Visit Information        Provider Department      4/16/2018 11:30 AM Clara Perry MD [facility]        Today's Diagnoses     Elevated LFTs    -  1    Personal history of malignant neoplasm of breast          Care Instructions    Dr. Perry would like you to repeat labs (LFT) in May and we will call you with results. We also would like you to have a Mammogram in July.     We would like to see you back in 6 months for a follow up appointment with labs prior.     When you are in need of a refill, please call your pharmacy and they will send us a request.      Copy of appointments, and after visit summary (AVS) given to patient.      If you have any questions please call Sonia Wild RN, BSN Oncology Hematology  [facility] [phone]. For questions after business hours, or on holidays/weekends, please call our after hours Nurse Triage line [phone]. Thank you.                 Repeat LFT in May, will call pt on result.   Due MA in July. 6 months f/u with labs.             Follow-ups after your visit        Your next 10 appointments already scheduled     May 01, 2018 10:30 AM CDT   LAB with PI LAB   [facility]    [address] [id number] [phone]           Please do not eat 10-12 hours before your appointment if you are coming in fasting for labs on lipids, cholesterol, or glucose (sugar). This does not apply to pregnant women. Water, hot tea and black coffee (with nothing added) are okay. Do not drink other fluids, diet soda or chew gum.            Aug 02, 2018 11:30 AM CDT   (Arrive by 11:15 AM)   MA SCREENING BILATERAL W/ CINTHYA with WYMA2   [facility] ([facility])    [address] [address]  "  [phone]           Three-dimensional (3D) mammograms are available at [facility] locations in [place], [place], [place], [place], [facility], [place], [place], and [place]. -Health locations include [place] and [facility] in [place]. Benefits of 3D mammograms include: - Improved rate of cancer detection - Decreases your chance of having to go back for more tests, which means fewer: - \"False-positive\" results (This means that there is an abnormal area but it isn't cancer.) - Invasive testing procedures, such as a biopsy or surgery - Can provide clearer images of the breast if you have dense breast tissue. 3D mammography is an optional exam that anyone can have with a 2D mammogram. It doesn't replace or take the place of a 2D mammogram. 2D mammograms remain an effective screening test for all women.  Not all insurance companies cover the cost of a 3D mammogram. Check with your insurance.            Oct 08, 2018 10:30 AM CDT   LAB with PI LAB   [facility]    [address] [address]   [phone]           Please do not eat 10-12 hours before your appointment if you are coming in fasting for labs on lipids, cholesterol, or glucose (sugar). This does not apply to pregnant women. Water, hot tea and black coffee (with nothing added) are okay. Do not drink other fluids, diet soda or chew gum.            Oct 15, 2018 11:30 AM CDT   Return Visit with Clara Perry MD   [facility] ([facility])    [facility]  [address] [address]   [phone]              Future tests that were ordered for you today     Open Future Orders        Priority Expected Expires Ordered    CBC with platelets differential Routine 10/1/2018 10/31/2018 4/16/2018    Ca27.29  breast tumor marker Routine 10/1/2018 10/31/2018 4/16/2018    Comprehensive metabolic panel Routine 10/1/2018 " "10/31/2018 4/16/2018    MA Screen Bilateral w/Wes Routine 7/1/2018 4/16/2019 4/16/2018    Hepatic panel Routine 5/1/2018 4/16/2019 4/16/2018            Who to contact     If you have questions or need follow up information about today's clinic visit or your schedule please contact [facility] directly at [phone].  Normal or non-critical lab and imaging results will be communicated to you by Hail Varsityhart, letter or phone within 4 business days after the clinic has received the results. If you do not hear from us within 7 days, please contact the clinic through Hail Varsityhart or phone. If you have a critical or abnormal lab result, we will notify you by phone as soon as possible.  Submit refill requests through [email or web address] or call your pharmacy and they will forward the refill request to us. Please allow 3 business days for your refill to be completed.          Additional Information About Your Visit        Hail VarsityharBaeta Information     [email or web address] gives you secure access to your electronic health record. If you see a primary care provider, you can also send messages to your care team and make appointments. If you have questions, please call your primary care clinic.  If you do not have a primary care provider, please call [phone] and they will assist you.        Care EveryWhere ID     This is your Care EveryWhere ID. This could be used by other organizations to access your [place] medical records  [id number]        Your Vitals Were     Pulse Temperature Respirations Height Pulse Oximetry Breastfeeding?    76 98.9  F (37.2  C) (Tympanic) 16 1.651 m (5' 5\") 97% No    BMI (Body Mass Index)                   28.52 kg/m2            Blood Pressure from Last 3 Encounters:   04/16/18 142/89   10/16/17 145/85   04/24/17 134/75    Weight from Last 3 Encounters:   04/16/18 77.7 kg (171 lb 6.4 oz)   10/16/17 77.3 kg (170 lb 6.4 oz)   04/24/17 77.1 kg (170 lb)               Primary Care Provider Office Phone # Fax #    Estevan Reynoso " [phone]       [facility] [address] [place] [address]        Equal Access to Services     JIMMY COWART : Hadii daija mayo dora Schulz, wabarbrada luumesh, qaarieta kaalmada wanda, colby caitlinaudi rojas lagamalielmilton gale So [facility] [phone].    ATENCIÓN: Si habla español, tiene a molina disposición servicios gratuitos de asistencia lingüística. Gabriel al [phone].    We comply with applicable federal civil rights laws and [place] laws. We do not discriminate on the basis of race, color, national origin, age, disability, sex, sexual orientation, or gender identity.            Thank you!     Thank you for choosing [facility]  for your care. Our goal is always to provide you with excellent care. Hearing back from our patients is one way we can continue to improve our services. Please take a few minutes to complete the written survey that you may receive in the mail after your visit with us. Thank you!             Your Updated Medication List - Protect others around you: Learn how to safely use, store and throw away your medicines at www.disposemymeds.org.          This list is accurate as of 4/16/18 12:01 PM.  Always use your most recent med list.                   Brand Name Dispense Instructions for use Diagnosis    anastrozole 1 MG tablet    ARIMIDEX    90 tablet    Take 1 tablet (1 mg) by mouth daily    Personal history of malignant neoplasm of breast       * ARMOUR THYROID 15 MG Tabs tablet   Generic drug:  thyroid      TAKE 1 TABLET BY MOUTH ONE TIME DAILY ON AN EMPTY STOMACH        * ARMOUR THYROID 60 MG tablet   Generic drug:  thyroid      Take 60 mg by mouth daily With the 15 mg [place] Thyroid for a total dose of 75 mg daily    Malignant neoplasm of upper-outer quadrant of left breast in female, estrogen receptor positive (H), Hot flushes, perimenopausal, Menopause, Osteopenia, unspecified location       * Notice:  This list has 2 medication(s) that are the  same as other medications prescribed for you. Read the directions carefully, and ask your doctor or other care provider to review them with you.

## 2018-04-16 NOTE — LETTER
4/16/2018         RE: Lisset Lopes  [address]  [facility] [address]        Dear Colleague,    Thank you for referring your patient, Lisset Lopes, to the [facility]. Please see a copy of my visit note below.    CC: left breast cancer 2013 s/p lumpectomy, DD AC and wkly taxol, s/p RT, on AI now.l      HISTORY OF PRESENT ILLNESS:  She presented at 43 MA 07/2013, identified heterogeneous breast and spiculated appearing mass + microcalcification.  She had a workup at [facility] initially.  Biopsy was done 08/2013, identified at 1 o'clock left breast, 2 cm from nipple fibroadenoma, 1 o'clock 10 cm from the nipple, invasive ductal cancer, grade 1, ER/VT 75% to 90% positive, HER-2/jose juan was negative, associated with DCIS, grade 1, without necrosis.    She subsequently went to [facility] to get surgical opinion from Dr. Kiran Kim and then went down to [facility].  Had a lumpectomy down there 10/03/2013, identified multifocal invasive ductal carcinoma with tubular features predominantly low nuclear grade 1.  The largest focus of invasive cancer 2.4 cm and second focus measured 1.4 cm, DCIS, low-grade, cribriform and micropapillary features, without necrosis.  Axillary lymph nodes, sentinel nodes #1 positive, the tumor focus measured 5 mm and has extranodal extension measures less than 1 mm.  Left axillary sentinel nodes #2 was negative.  Margin was negative.  She has pathologic T2 N1a invasive ductal cancer, multifocal disease, left breast cancer.   Staging PET has noise at surgical bed.   Oncotype RS is 22.     She made informed decision to proceed with DD AC then wkly taxol. C1D1 DD AC 11/26/2013. She went down to [place] and continued on the tx. She finished all the chemo in 4/2014.    She finished RT 7/2014. Tamoxifen is started then. LMP 12/2013. Tamoxifen is changed to Arimidex 4/2016.      PAST MEDICAL HISTORY:  Hypothyroidism off Synthroid due to poor tolerance. PE in RLL and  "distal LLL in 1/2014 in [place] while on chemo s/p 6 months of coumadin till 7/2014.     FAMILY HISTORY:  Not significant for any breast cancer or any malignancy.      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 Denies smoking.  She likes to drink, not every day.      REVIEW OF SYSTEMS:    Still has hot flushes.   She is tolerating AI ok.   She just got over from an URI.       PHYSICAL EXAMINATION   VITAL SIGNS:  Blood pressure 142/89, pulse 76, temperature 98.9  F (37.2  C), temperature source Tympanic, resp. rate 16, height 1.651 m (5' 5\"), weight 77.7 kg (171 lb 6.4 oz), SpO2 97 %, not currently breastfeeding.  GENERAL APPEARANCE:  A young woman, looks like her stated age, not in acute distress.     HEENT: The patient is normocephalic, atraumatic. Pupils are equal react to light.  Sclerae are anicteric.  Moist oral mucosa.  Negative pharynx.  No oral thrush.   NECK:  Supple.  No jugular venous distention.  Thyroid is not palpable.   LYMPH NODES:  Superficial lymphadenopathy is not appreciable in the bilateral cervical, supraclavicular, axillary or inguinal adenopathy.   CARDIOVASCULAR:  S1, S2 regular with no murmurs or gallops.  No carotid or abdominal bruits. Port site is clear.  PULMONARY:  Lungs are clear to auscultation and percussion bilaterally.  There is no wheezing or rhonchi.   GASTROINTESTINAL:  Abdomen is soft, nontender.  No hepatosplenomegaly.  No signs of ascites.  No mass appreciable.   MUSCULOSKELETAL/EXTREMITIES:  No edema.  No cyanotic changes.  No signs of joint deformity.  No lymphedema.   NEUROLOGIC:  Cranial nerves II-XII are grossly intact.  Sensation intact.  Muscle strength and muscle tone symmetrical all through 5/5.   BACK:  No spinal or paraspinal tenderness.  No CVA tenderness.   SKIN:  No petechiae.  No rash.  No signs of cellulitis.   BREASTS:  Left breast has post RT changes. Scars are smooth.  chronic bilateral nipples retraction.  " Right breast revealed no skin changes, no palpable lesions. She has dense breasts on both sides, left is more than right.       CURRENT LAB DATAREVIWED   WBC nl from 3.8 from  3.2 from 3.4 from 2.8, anc nl,   marker is good.  AST/ALT are elevated in 4/2018    CURRENT IMAGE REVIEWED  Dexa from 12/2017 outside - osteopenia.  MA dx b/l 7/2017: negative    Old data reviewed with summary  CT BODY 4/2015:  1. No evidence of mets  2. sclerotic foci are unchanged dating back to 11/12/2013 which is supportive evidence for a benign entity.  3. Stable AMEYA pulmonary nodule stable, stable liver lesion and left adrenal changes consistent with benign etiology.   4. Fatty liver    CT 7/2014 - clearing of PE.  CT in [place] 1/2014 - PE in RLL and more distal LLL with subsegmental atelectasis, ? Pulmonary infarct.    PET 4/2014 - negative for activity.     MUGA 11/20-13 - EF 71%.   Thyroid sono 11/2013 - There is asymmetric enlargement of the right lobe of the gland which is heterogeneous and hyperemic. This can relate to an asymmetric goiter and/or thyroiditis. This accounts for the somewhat diffuse uptake in the right lobe of the thyroid gland on the recent PET/CT. More specific attention to the upper pole of the right lobe where slightly more focal activity is seen on the PET/CT shows no discrete nodule. There is an incidental small nodule in the lower pole of the right lobe of the thyroid gland with no convincing separate standout uptake on the PET/CT.     Oncotype dx RS 22, correspond to 10 yr distal rate of recurrence of 14% if only Tamoxifen , RT.    BRCA1 and BRCA2 test was negative from [facility] in 09/2013.       ASSESSMENT AND PLAN:    1. pathologic T2 N1a left breast cancer dx in premenopausally at age 43 years old in 2013.  She had T2N1a stage II disease. RS at 22.      She was offered DD AC then  taxol from 11/2013 to 4/2014.    She finished RT 7/2014.   She started on Tamoxifen summer 2014. LMP  12/2013.  Tamoxifen was changed to Arimidex 4/2016.     I shared with her the 10 yr data of anti hormone thearpy. She will benefit from longer anti hormone therapy.      She is due 6 months f/u with labs.  MA 3D is due in July.       2. Hot flushes. She tried clodinine patch, it did not work. Acupuncture did not work either.   Effexor 37.5 mg seemed to be helpful.     3. Osteopenia - advice vit D and Ca and out door activity.     4. Elevated LFT - likely from OCT cold medicine she took. She is over URI now, would repeat LFT in May.          Again, thank you for allowing me to participate in the care of your patient.        Sincerely,        Clara Perry MD, MD

## 2018-05-01 DIAGNOSIS — R79.89 ELEVATED LFTS: ICD-10-CM

## 2018-05-01 DIAGNOSIS — Z85.3 PERSONAL HISTORY OF MALIGNANT NEOPLASM OF BREAST: ICD-10-CM

## 2018-05-01 LAB
ALBUMIN SERPL-MCNC: 4 G/DL (ref 3.4–5)
ALP SERPL-CCNC: 88 U/L (ref 40–150)
ALT SERPL W P-5'-P-CCNC: 29 U/L (ref 0–50)
AST SERPL W P-5'-P-CCNC: 18 U/L (ref 0–45)
BILIRUB DIRECT SERPL-MCNC: 0.3 MG/DL (ref 0–0.2)
BILIRUB SERPL-MCNC: 1.3 MG/DL (ref 0.2–1.3)
PROT SERPL-MCNC: 7.9 G/DL (ref 6.8–8.8)

## 2018-05-01 PROCEDURE — 36415 COLL VENOUS BLD VENIPUNCTURE: CPT | Performed by: INTERNAL MEDICINE

## 2018-05-01 PROCEDURE — 80076 HEPATIC FUNCTION PANEL: CPT | Performed by: INTERNAL MEDICINE

## 2018-08-02 ENCOUNTER — HOSPITAL ENCOUNTER (OUTPATIENT)
Dept: MAMMOGRAPHY | Facility: CLINIC | Age: 48
Discharge: HOME | End: 2018-08-02
Attending: INTERNAL MEDICINE | Admitting: INTERNAL MEDICINE
Payer: COMMERCIAL

## 2018-08-02 DIAGNOSIS — Z85.3 PERSONAL HISTORY OF MALIGNANT NEOPLASM OF BREAST: ICD-10-CM

## 2018-08-02 DIAGNOSIS — R79.89 ELEVATED LFTS: ICD-10-CM

## 2018-08-02 PROCEDURE — 77067 SCR MAMMO BI INCL CAD: CPT

## 2018-08-06 ENCOUNTER — DOCUMENTATION ONLY (OUTPATIENT)
Dept: ONCOLOGY | Facility: CLINIC | Age: 48
End: 2018-08-06

## 2018-08-06 NOTE — PROGRESS NOTES
Reviewed results and Dr. Perry's recommendations with patient. Denies questions or concerns. Will call back if any arise. Direct line provided.

## 2018-10-08 DIAGNOSIS — Z85.3 PERSONAL HISTORY OF MALIGNANT NEOPLASM OF BREAST: ICD-10-CM

## 2018-10-08 DIAGNOSIS — R79.89 ELEVATED LFTS: ICD-10-CM

## 2018-10-08 LAB
ALBUMIN SERPL-MCNC: 3.8 G/DL (ref 3.4–5)
ALP SERPL-CCNC: 87 U/L (ref 40–150)
ALT SERPL W P-5'-P-CCNC: 37 U/L (ref 0–50)
ANION GAP SERPL CALCULATED.3IONS-SCNC: 5 MMOL/L (ref 3–14)
AST SERPL W P-5'-P-CCNC: 20 U/L (ref 0–45)
BASOPHILS # BLD AUTO: 0 10E9/L (ref 0–0.2)
BASOPHILS NFR BLD AUTO: 0.3 %
BILIRUB SERPL-MCNC: 1.2 MG/DL (ref 0.2–1.3)
BUN SERPL-MCNC: 16 MG/DL (ref 7–30)
CALCIUM SERPL-MCNC: 9.4 MG/DL (ref 8.5–10.1)
CANCER AG27-29 SERPL-ACNC: 16 U/ML (ref 0–39)
CHLORIDE SERPL-SCNC: 99 MMOL/L (ref 94–109)
CO2 SERPL-SCNC: 34 MMOL/L (ref 20–32)
CREAT SERPL-MCNC: 0.76 MG/DL (ref 0.52–1.04)
DIFFERENTIAL METHOD BLD: ABNORMAL
EOSINOPHIL # BLD AUTO: 0.1 10E9/L (ref 0–0.7)
EOSINOPHIL NFR BLD AUTO: 2.3 %
ERYTHROCYTE [DISTWIDTH] IN BLOOD BY AUTOMATED COUNT: 14.1 % (ref 10–15)
GFR SERPL CREATININE-BSD FRML MDRD: 81 ML/MIN/1.7M2
GLUCOSE SERPL-MCNC: 88 MG/DL (ref 70–99)
HCT VFR BLD AUTO: 42.8 % (ref 35–47)
HGB BLD-MCNC: 14.1 G/DL (ref 11.7–15.7)
LYMPHOCYTES # BLD AUTO: 1 10E9/L (ref 0.8–5.3)
LYMPHOCYTES NFR BLD AUTO: 29.8 %
MCH RBC QN AUTO: 29.8 PG (ref 26.5–33)
MCHC RBC AUTO-ENTMCNC: 32.9 G/DL (ref 31.5–36.5)
MCV RBC AUTO: 91 FL (ref 78–100)
MONOCYTES # BLD AUTO: 0.4 10E9/L (ref 0–1.3)
MONOCYTES NFR BLD AUTO: 11.7 %
NEUTROPHILS # BLD AUTO: 2 10E9/L (ref 1.6–8.3)
NEUTROPHILS NFR BLD AUTO: 55.9 %
PLATELET # BLD AUTO: 195 10E9/L (ref 150–450)
POTASSIUM SERPL-SCNC: 4.1 MMOL/L (ref 3.4–5.3)
PROT SERPL-MCNC: 7.6 G/DL (ref 6.8–8.8)
RBC # BLD AUTO: 4.73 10E12/L (ref 3.8–5.2)
SODIUM SERPL-SCNC: 138 MMOL/L (ref 133–144)
WBC # BLD AUTO: 3.5 10E9/L (ref 4–11)

## 2018-10-08 PROCEDURE — 85025 COMPLETE CBC W/AUTO DIFF WBC: CPT | Performed by: INTERNAL MEDICINE

## 2018-10-08 PROCEDURE — 86300 IMMUNOASSAY TUMOR CA 15-3: CPT | Performed by: INTERNAL MEDICINE

## 2018-10-08 PROCEDURE — 36415 COLL VENOUS BLD VENIPUNCTURE: CPT | Performed by: INTERNAL MEDICINE

## 2018-10-08 PROCEDURE — 80053 COMPREHEN METABOLIC PANEL: CPT | Performed by: INTERNAL MEDICINE

## 2018-10-15 ENCOUNTER — ONCOLOGY VISIT (OUTPATIENT)
Dept: ONCOLOGY | Facility: CLINIC | Age: 48
End: 2018-10-15
Attending: INTERNAL MEDICINE
Payer: COMMERCIAL

## 2018-10-15 VITALS
RESPIRATION RATE: 16 BRPM | DIASTOLIC BLOOD PRESSURE: 69 MMHG | HEIGHT: 65 IN | BODY MASS INDEX: 28.27 KG/M2 | OXYGEN SATURATION: 99 % | TEMPERATURE: 97.7 F | HEART RATE: 72 BPM | WEIGHT: 169.7 LBS | SYSTOLIC BLOOD PRESSURE: 136 MMHG

## 2018-10-15 DIAGNOSIS — M25.50 MULTIPLE JOINT PAIN: ICD-10-CM

## 2018-10-15 DIAGNOSIS — M85.80 OSTEOPENIA, UNSPECIFIED LOCATION: ICD-10-CM

## 2018-10-15 DIAGNOSIS — N95.1 HOT FLUSHES, PERIMENOPAUSAL: ICD-10-CM

## 2018-10-15 DIAGNOSIS — Z85.3 PERSONAL HISTORY OF MALIGNANT NEOPLASM OF BREAST: ICD-10-CM

## 2018-10-15 DIAGNOSIS — Z78.0 MENOPAUSE: Primary | ICD-10-CM

## 2018-10-15 PROCEDURE — G0463 HOSPITAL OUTPT CLINIC VISIT: HCPCS

## 2018-10-15 PROCEDURE — 99214 OFFICE O/P EST MOD 30 MIN: CPT | Performed by: INTERNAL MEDICINE

## 2018-10-15 RX ORDER — EXEMESTANE 25 MG/1
25 TABLET ORAL DAILY
Qty: 90 TABLET | Refills: 1 | Status: SHIPPED | OUTPATIENT
Start: 2018-10-15 | End: 2018-10-18

## 2018-10-15 ASSESSMENT — PAIN SCALES - GENERAL: PAINLEVEL: NO PAIN (0)

## 2018-10-15 NOTE — NURSING NOTE
"Oncology Rooming Note    October 15, 2018 11:26 AM   Lisset Lopes is a 48 year old female who presents for:    Chief Complaint   Patient presents with     Oncology Clinic Visit     6 month follow up breast CA. Review lab results.      Initial Vitals: /69 (BP Location: Right arm, Patient Position: Sitting, Cuff Size: Adult Large)  Pulse 72  Temp 97.7  F (36.5  C) (Tympanic)  Resp 16  Ht 1.651 m (5' 5\")  Wt 77 kg (169 lb 11.2 oz)  SpO2 99%  Breastfeeding? No  BMI 28.24 kg/m2 Estimated body mass index is 28.24 kg/(m^2) as calculated from the following:    Height as of this encounter: 1.651 m (5' 5\").    Weight as of this encounter: 77 kg (169 lb 11.2 oz). Body surface area is 1.88 meters squared.  No Pain (0) Comment: Data Unavailable   No LMP recorded. Patient is not currently having periods (Reason: Irregular Periods).  Allergies reviewed: Yes  Medications reviewed: Yes    Medications: Medication refills not needed today.  Pharmacy name entered into EPIC: [id number] [address]    Clinical concerns: 6 month follow up breast CA. Review lab results. C/o shoulders and elbows intermittently aching.     8 minutes for nursing intake (face to face time)     Trinidad Espinoza [facility]            "

## 2018-10-15 NOTE — PROGRESS NOTES
CC: left breast cancer 2013 s/p lumpectomy, DD AC and wkly taxol, s/p RT, on AI now.      HISTORY OF PRESENT ILLNESS:  She presented at 43 MA 07/2013, identified heterogeneous breast and spiculated appearing mass + microcalcification.  She had a workup at [facility] initially.  Biopsy was done 08/2013, identified at 1 o'clock left breast, 2 cm from nipple fibroadenoma, 1 o'clock 10 cm from the nipple, invasive ductal cancer, grade 1, ER/AZ 75% to 90% positive, HER-2/jose juan was negative, associated with DCIS, grade 1, without necrosis.    She subsequently went to [facility] to get surgical opinion from Dr. Kiran Kim and then went down to [facility].    Had a lumpectomy down there 10/03/2013, identified multifocal invasive ductal carcinoma with tubular features predominantly low nuclear grade 1.  The largest focus of invasive cancer 2.4 cm and second focus measured 1.4 cm, DCIS, low-grade, cribriform and micropapillary features, without necrosis.  Axillary lymph nodes, sentinel nodes #1 positive, the tumor focus measured 5 mm and has extranodal extension measures less than 1 mm.  Left axillary sentinel nodes #2 was negative.  Margin was negative.  She has pathologic T2 N1a invasive ductal cancer, multifocal disease, left breast cancer.   Staging PET has noise at surgical bed.   Oncotype RS is 22.     She made informed decision to proceed with DD AC then wkly taxol. C1D1 DD AC 11/26/2013. She went down to [place] and continued on the tx. She finished all the chemo in 4/2014.    She finished RT 7/2014. Tamoxifen is started then. LMP 12/2013. Tamoxifen was changed to Arimidex 4/2016.   Due to ongoing severe hot flushes and new joints pain, advice try Aromasin in 10/2018.        PAST MEDICAL HISTORY:  Hypothyroidism off Synthroid due to poor tolerance. PE in RLL and distal LLL in 1/2014 in [place] while on chemo s/p 6 months of coumadin till 7/2014.     FAMILY HISTORY:  Not significant for any breast cancer or  "any malignancy.      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 Denies smoking.  She likes to drink, not every day.      REVIEW OF SYSTEMS:    Still has hot flushes on the hrs.    She is tolerating AI ok.   She starts to have joints pain.     PHYSICAL EXAMINATION   VITAL SIGNS:  Blood pressure 136/69, pulse 72, temperature 97.7  F (36.5  C), temperature source Tympanic, resp. rate 16, height 1.651 m (5' 5\"), weight 77 kg (169 lb 11.2 oz), SpO2 99 %, not currently breastfeeding.     ECOG 0.     GENERAL APPEARANCE:  A young woman, looks like her stated age, not in acute distress.     HEENT: The patient is normocephalic, atraumatic. Pupils are equal react to light.  Sclerae are anicteric.  Moist oral mucosa.  Negative pharynx.  No oral thrush.   NECK:  Supple.  No jugular venous distention.  Thyroid is not palpable.   LYMPH NODES:  Superficial lymphadenopathy is not appreciable in the bilateral cervical, supraclavicular, axillary or inguinal adenopathy.   CARDIOVASCULAR:  S1, S2 regular with no murmurs or gallops.  No carotid or abdominal bruits. Port site is clear.  PULMONARY:  Lungs are clear to auscultation and percussion bilaterally.  There is no wheezing or rhonchi.   GASTROINTESTINAL:  Abdomen is soft, nontender.  No hepatosplenomegaly.  No signs of ascites.  No mass appreciable.   MUSCULOSKELETAL/EXTREMITIES:  No edema.  No cyanotic changes.  No signs of joint deformity.  No lymphedema.   NEUROLOGIC:  Cranial nerves II-XII are grossly intact.  Sensation intact.  Muscle strength and muscle tone symmetrical all through 5/5.   BACK:  No spinal or paraspinal tenderness.  No CVA tenderness.   SKIN:  No petechiae.  No rash.  No signs of cellulitis.   BREASTS:  Left breast has post RT changes. Scars are smooth.  chronic bilateral nipples retraction.  Right breast revealed no skin changes, no palpable lesions. She has dense breasts on both sides, left is more than " right.       CURRENT LAB DATAREVIWED   WBC nl from 3.5 from 3.8 from  3.2 from 3.4 from 2.8, anc nl,   marker is good.  AST/ALT nl, elevated in 4/2018      CURRENT IMAGE REVIEWED  MA dx b/l 8/2018: negative    Old data reviewed with summary  Dexa from 12/2017 outside - osteopenia.CT BODY 4/2015:  1. No evidence of mets  2. sclerotic foci are unchanged dating back to 11/12/2013 which is supportive evidence for a benign entity.  3. Stable AMEYA pulmonary nodule stable, stable liver lesion and left adrenal changes consistent with benign etiology.   4. Fatty liver    CT 7/2014 - clearing of PE.  CT in [place] 1/2014 - PE in RLL and more distal LLL with subsegmental atelectasis, ? Pulmonary infarct.    PET 4/2014 - negative for activity.     MUGA 11/20-13 - EF 71%.   Thyroid sono 11/2013 - There is asymmetric enlargement of the right lobe of the gland which is heterogeneous and hyperemic. This can relate to an asymmetric goiter and/or thyroiditis. This accounts for the somewhat diffuse uptake in the right lobe of the thyroid gland on the recent PET/CT. More specific attention to the upper pole of the right lobe where slightly more focal activity is seen on the PET/CT shows no discrete nodule. There is an incidental small nodule in the lower pole of the right lobe of the thyroid gland with no convincing separate standout uptake on the PET/CT.     Oncotype dx RS 22, correspond to 10 yr distal rate of recurrence of 14% if only Tamoxifen , RT.    BRCA1 and BRCA2 test was negative from [facility] in 09/2013.       ASSESSMENT AND PLAN:    1. pathologic T2 N1a left breast cancer dx in premenopausally at age 43 years old in 2013.  She had T2N1a stage II disease. RS at 22.      She was offered DD AC then  taxol from 11/2013 to 4/2014.    She finished RT 7/2014.   She started on Tamoxifen summer 2014. LMP 12/2013.  Tamoxifen was changed to Arimidex 4/2016.     Due to ongoing severe hot flushes and new joints pain, advice  try Aromasin in 10/2018.     I shared with her the 10 yr data of anti hormone thearpy. She will benefit from longer anti hormone therapy.      She is due 6 months f/u with labs.  MA 3D is due in July.       2. Hot flushes. She tried clodinine patch, effexor, acupuncture, nothing worked.        3. Osteopenia - advice vit D and Ca and out door activity. She is due repeat dexa.   This may swing us decision on whether do long term AI.       4. New joints pain and stiffness - advice yoga, glucosamine.

## 2018-10-15 NOTE — MR AVS SNAPSHOT
After Visit Summary   10/15/2018    Lisset Lopes    MRN: [id number]           Patient Information     Date Of Birth          1970        Visit Information        Provider Department      10/15/2018 11:30 AM Clara Perry MD [facility]        Today's Diagnoses     Menopause    -  1    Personal history of malignant neoplasm of breast        Hot flushes, perimenopausal        Osteopenia, unspecified location          Care Instructions    We would like to see you back in clinic with Dr. Perry in 6 months with labs prior. You will need to have a dexa scan in December 2018. We will call you with the results.      Your prescription (aromasin) has been sent to:   [facility] - [place], [place] - [address]  [address]  [facility] [address]  Phone: [phone] Fax: [phone]  When you are in need of a refill, please call your pharmacy and they will send us a request.      Copy of appointments, and after visit summary (AVS) given to patient.      If you have any questions during business hours (M-F 8 AM- 4PM), please call Mela Kim RN, BSN, OCN Oncology Hematology /Breast Cancer Navigator at [facility] [phone].       For questions after business hours, or on holidays/weekends, please call our after hours Nurse Triage line [phone]. Thank you.          Due dexa in Dec. Will call pt on result. Due 6 months f/u with labs.           Follow-ups after your visit        Your next 10 appointments already scheduled     Dec 11, 2018 11:00 AM CST   (Arrive by 10:45 AM)   DX HIP/PELVIS/SPINE with WYDX1   [facility] ([facility])    [address] [address]   [phone]           How do I prepare for my exam? (Food and drink instructions) No Food and Drink Restrictions.  How do I prepare for my exam? (Other instructions) Please do not take any of the following 24 hours prior to the day  of your exam: vitamins, calcium tablets, antacids.  What should I wear: If possible, please wear clothes without metal (snaps, zippers). A sweat suit works well.  How long does the exam take: The exam takes about 20 minutes.  What should I bring: Bring a list of your current medicines to your exam (including vitamins, minerals and over-the-counter drugs).  Do I need a :  No  is needed.  What should I do after the exam: No restrictions, You may resume normal activities.  How do I prepare for my exam? (Food and drink instructions) A DEXA scan is a bone-density scan. It uses a low level of radiation to check the strength of your bones. As you lie on a padded table, a machine will take X-rays. We most often scan the hips and lower spine.  Who should I call with questions: If you have any questions, please call the Imaging Department where you will have your exam. Directions, parking instructions, and other information is available on our website, [email or web address].            Apr 08, 2019 10:30 AM CDT   [facility]   [facility]    [address] [address]   [phone]           Please do not eat 10-12 hours before your appointment if you are coming in fasting for labs on lipids, cholesterol, or glucose (sugar). This does not apply to pregnant women. Water, hot tea and black coffee (with nothing added) are okay. Do not drink other fluids, diet soda or chew gum.            Apr 15, 2019 11:45 AM CDT   Return Visit with Clara Perry MD   [facility] ([facility])    [facility]  [address]  [facility] [address]   [phone]              Future tests that were ordered for you today     Open Future Orders        Priority Expected Expires Ordered    CBC with platelets differential Routine 4/1/2019 5/31/2019 10/15/2018    Comprehensive metabolic panel Routine 4/1/2019 5/31/2019  "10/15/2018    Ca27.29  breast tumor marker Routine 4/1/2019 5/31/2019 10/15/2018    DX Hip/Pelvis/Spine Routine 12/1/2018 10/15/2019 10/15/2018            Who to contact     If you have questions or need follow up information about today's clinic visit or your schedule please contact [facility] directly at [phone].  Normal or non-critical lab and imaging results will be communicated to you by Borrohart, letter or phone within 4 business days after the clinic has received the results. If you do not hear from us within 7 days, please contact the clinic through [employer] or phone. If you have a critical or abnormal lab result, we will notify you by phone as soon as possible.  Submit refill requests through [employer] Connect or call your pharmacy and they will forward the refill request to us. Please allow 3 business days for your refill to be completed.          Additional Information About Your Visit        [facility] Medical Information     [employer] Connect gives you secure access to your electronic health record. If you see a primary care provider, you can also send messages to your care team and make appointments. If you have questions, please call your primary care clinic.  If you do not have a primary care provider, please call [phone] and they will assist you.        Care EveryWhere ID     This is your Care EveryWhere ID. This could be used by other organizations to access your [facility] medical records  [id number]        Your Vitals Were     Pulse Temperature Respirations Height Pulse Oximetry Breastfeeding?    72 97.7  F (36.5  C) (Tympanic) 16 1.651 m (5' 5\") 99% No    BMI (Body Mass Index)                   28.24 kg/m2            Blood Pressure from Last 3 Encounters:   10/15/18 136/69   04/16/18 142/89   10/16/17 145/85    Weight from Last 3 Encounters:   10/15/18 77 kg (169 lb 11.2 oz)   04/16/18 77.7 kg (171 lb 6.4 oz)   10/16/17 77.3 kg (170 lb 6.4 oz)                 Today's Medication Changes          These changes " are accurate as of 10/15/18 12:00 PM.  If you have any questions, ask your nurse or doctor.               Start taking these medicines.        Dose/Directions    exemestane 25 MG tablet   Commonly known as:  AROMASIN   Used for:  Personal history of malignant neoplasm of breast   Started by:  Clara Perry MD        Dose:  25 mg   Take 1 tablet (25 mg) by mouth daily   Quantity:  90 tablet   Refills:  1         These medicines have changed or have updated prescriptions.        Dose/Directions    ARMOUR THYROID 60 MG tablet   This may have changed:  Another medication with the same name was removed. Continue taking this medication, and follow the directions you see here.   Used for:  Malignant neoplasm of upper-outer quadrant of left breast in female, estrogen receptor positive (H), Hot flushes, perimenopausal, Menopause, Osteopenia, unspecified location   Generic drug:  thyroid   Changed by:  Clara Perry MD        Dose:  60 mg   Take 60 mg by mouth daily With the 15 mg [place] Thyroid for a total dose of 75 mg daily   Refills:  2         Stop taking these medicines if you haven't already. Please contact your care team if you have questions.     anastrozole 1 MG tablet   Commonly known as:  ARIMIDEX   Stopped by:  Clara Perry MD                Where to get your medicines      These medications were sent to [id number] - [place], [place] - [address]  [address] [place] [address]     Phone:  [phone]     exemestane 25 MG tablet                Primary Care Provider Office Phone # Fax #    Estevan Reynoso [phone]       [facility] [address] [place] [address]        Equal Access to Services     [facility] AH: Hadii daija ku hadasho Sofannyali, waaxda luqadaha, qaybta kaalmada adeegyada, colby tanner. So [facility] [phone].    ATENCIÓN: Si habla español, tiene a molina disposición servicios gratuitos de asistencia lingüística. Llame al  [phone].    We comply with applicable federal civil rights laws and [place] laws. We do not discriminate on the basis of race, color, national origin, age, disability, sex, sexual orientation, or gender identity.            Thank you!     Thank you for choosing [facility]  for your care. Our goal is always to provide you with excellent care. Hearing back from our patients is one way we can continue to improve our services. Please take a few minutes to complete the written survey that you may receive in the mail after your visit with us. Thank you!             Your Updated Medication List - Protect others around you: Learn how to safely use, store and throw away your medicines at www.disposemymeds.org.          This list is accurate as of 10/15/18 12:00 PM.  Always use your most recent med list.                   Brand Name Dispense Instructions for use Diagnosis    ARMOUR THYROID 60 MG tablet   Generic drug:  thyroid      Take 60 mg by mouth daily With the 15 mg [place] Thyroid for a total dose of 75 mg daily    Malignant neoplasm of upper-outer quadrant of left breast in female, estrogen receptor positive (H), Hot flushes, perimenopausal, Menopause, Osteopenia, unspecified location       exemestane 25 MG tablet    AROMASIN    90 tablet    Take 1 tablet (25 mg) by mouth daily    Personal history of malignant neoplasm of breast

## 2018-10-15 NOTE — PATIENT INSTRUCTIONS
We would like to see you back in clinic with Dr. Perry in 6 months with labs prior. You will need to have a dexa scan in December 2018. We will call you with the results.      Your prescription (aromasin) has been sent to:   [facility] - [place], [place] - [address]  [address] [address]  Phone: [phone] Fax: [phone]  When you are in need of a refill, please call your pharmacy and they will send us a request.      Copy of appointments, and after visit summary (AVS) given to patient.      If you have any questions during business hours (M-F 8 AM- 4PM), please call Mela Kim RN, BSN, OCN Oncology Hematology /Breast Cancer Navigator at [facility] [phone].       For questions after business hours, or on holidays/weekends, please call our after hours Nurse Triage line [phone]. Thank you.          Due dexa in Dec. Will call pt on result. Due 6 months f/u with labs.

## 2018-10-15 NOTE — LETTER
10/15/2018         RE: Lisset Lopes  [address]  [facility] [address]        Dear Colleague,    Thank you for referring your patient, Lisset Lopes, to the [facility]. Please see a copy of my visit note below.    CC: left breast cancer 2013 s/p lumpectomy, DD AC and wkly taxol, s/p RT, on AI now.      HISTORY OF PRESENT ILLNESS:  She presented at 43 MA 07/2013, identified heterogeneous breast and spiculated appearing mass + microcalcification.  She had a workup at [facility] initially.  Biopsy was done 08/2013, identified at 1 o'clock left breast, 2 cm from nipple fibroadenoma, 1 o'clock 10 cm from the nipple, invasive ductal cancer, grade 1, ER/RI 75% to 90% positive, HER-2/jose juan was negative, associated with DCIS, grade 1, without necrosis.    She subsequently went to [facility] to get surgical opinion from Dr. Kiran Kim and then went down to [place].    Had a lumpectomy down there 10/03/2013, identified multifocal invasive ductal carcinoma with tubular features predominantly low nuclear grade 1.  The largest focus of invasive cancer 2.4 cm and second focus measured 1.4 cm, DCIS, low-grade, cribriform and micropapillary features, without necrosis.  Axillary lymph nodes, sentinel nodes #1 positive, the tumor focus measured 5 mm and has extranodal extension measures less than 1 mm.  Left axillary sentinel nodes #2 was negative.  Margin was negative.  She has pathologic T2 N1a invasive ductal cancer, multifocal disease, left breast cancer.   Staging PET has noise at surgical bed.   Oncotype RS is 22.     She made informed decision to proceed with DD AC then wkly taxol. C1D1 DD AC 11/26/2013. She went down to [place] and continued on the tx. She finished all the chemo in 4/2014.    She finished RT 7/2014. Tamoxifen is started then. LMP 12/2013. Tamoxifen was changed to Arimidex 4/2016.   Due to ongoing severe hot flushes and new joints pain, advice try Aromasin  "in 10/2018.        PAST MEDICAL HISTORY:  Hypothyroidism off Synthroid due to poor tolerance. PE in RLL and distal LLL in 1/2014 in [place] while on chemo s/p 6 months of coumadin till 7/2014.     FAMILY HISTORY:  Not significant for any breast cancer or any malignancy.      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 Denies smoking.  She likes to drink, not every day.      REVIEW OF SYSTEMS:    Still has hot flushes on the hrs.    She is tolerating AI ok.   She starts to have joints pain.     PHYSICAL EXAMINATION   VITAL SIGNS:  Blood pressure 136/69, pulse 72, temperature 97.7  F (36.5  C), temperature source Tympanic, resp. rate 16, height 1.651 m (5' 5\"), weight 77 kg (169 lb 11.2 oz), SpO2 99 %, not currently breastfeeding.     ECOG 0.     GENERAL APPEARANCE:  A young woman, looks like her stated age, not in acute distress.     HEENT: The patient is normocephalic, atraumatic. Pupils are equal react to light.  Sclerae are anicteric.  Moist oral mucosa.  Negative pharynx.  No oral thrush.   NECK:  Supple.  No jugular venous distention.  Thyroid is not palpable.   LYMPH NODES:  Superficial lymphadenopathy is not appreciable in the bilateral cervical, supraclavicular, axillary or inguinal adenopathy.   CARDIOVASCULAR:  S1, S2 regular with no murmurs or gallops.  No carotid or abdominal bruits. Port site is clear.  PULMONARY:  Lungs are clear to auscultation and percussion bilaterally.  There is no wheezing or rhonchi.   GASTROINTESTINAL:  Abdomen is soft, nontender.  No hepatosplenomegaly.  No signs of ascites.  No mass appreciable.   MUSCULOSKELETAL/EXTREMITIES:  No edema.  No cyanotic changes.  No signs of joint deformity.  No lymphedema.   NEUROLOGIC:  Cranial nerves II-XII are grossly intact.  Sensation intact.  Muscle strength and muscle tone symmetrical all through 5/5.   BACK:  No spinal or paraspinal tenderness.  No CVA tenderness.   SKIN:  No petechiae.  No " rash.  No signs of cellulitis.   BREASTS:  Left breast has post RT changes. Scars are smooth.  chronic bilateral nipples retraction.  Right breast revealed no skin changes, no palpable lesions. She has dense breasts on both sides, left is more than right.       CURRENT LAB DATAREVIWED   WBC nl from 3.5 from 3.8 from  3.2 from 3.4 from 2.8, anc nl,   marker is good.  AST/ALT nl, elevated in 4/2018      CURRENT IMAGE REVIEWED  MA dx b/l 8/2018: negative    Old data reviewed with summary  Dexa from 12/2017 outside - osteopenia.CT BODY 4/2015:  1. No evidence of mets  2. sclerotic foci are unchanged dating back to 11/12/2013 which is supportive evidence for a benign entity.  3. Stable AMEYA pulmonary nodule stable, stable liver lesion and left adrenal changes consistent with benign etiology.   4. Fatty liver    CT 7/2014 - clearing of PE.  CT in [place] 1/2014 - PE in RLL and more distal LLL with subsegmental atelectasis, ? Pulmonary infarct.    PET 4/2014 - negative for activity.     MUGA 11/20-13 - EF 71%.   Thyroid sono 11/2013 - There is asymmetric enlargement of the right lobe of the gland which is heterogeneous and hyperemic. This can relate to an asymmetric goiter and/or thyroiditis. This accounts for the somewhat diffuse uptake in the right lobe of the thyroid gland on the recent PET/CT. More specific attention to the upper pole of the right lobe where slightly more focal activity is seen on the PET/CT shows no discrete nodule. There is an incidental small nodule in the lower pole of the right lobe of the thyroid gland with no convincing separate standout uptake on the PET/CT.     Oncotype dx RS 22, correspond to 10 yr distal rate of recurrence of 14% if only Tamoxifen , RT.    BRCA1 and BRCA2 test was negative from [facility] in 09/2013.       ASSESSMENT AND PLAN:    1. pathologic T2 N1a left breast cancer dx in premenopausally at age 43 years old in 2013.  She had T2N1a stage II disease. RS at 22.       She was offered DD AC then  taxol from 11/2013 to 4/2014.    She finished RT 7/2014.   She started on Tamoxifen summer 2014. LMP 12/2013.  Tamoxifen was changed to Arimidex 4/2016.     Due to ongoing severe hot flushes and new joints pain, advice try Aromasin in 10/2018.     I shared with her the 10 yr data of anti hormone thearpy. She will benefit from longer anti hormone therapy.      She is due 6 months f/u with labs.  MA 3D is due in July.       2. Hot flushes. She tried clodinine patch, effexor, acupuncture, nothing worked.        3. Osteopenia - advice vit D and Ca and out door activity. She is due repeat dexa.   This may swing us decision on whether do long term AI.       4. New joints pain and stiffness - advice yoga, glucosamine.        Again, thank you for allowing me to participate in the care of your patient.        Sincerely,        Clara Perry MD, MD

## 2018-10-18 DIAGNOSIS — Z85.3 PERSONAL HISTORY OF MALIGNANT NEOPLASM OF BREAST: ICD-10-CM

## 2018-10-18 RX ORDER — ANASTROZOLE 1 MG/1
1 TABLET ORAL DAILY
Qty: 90 TABLET | Refills: 3 | Status: SHIPPED | OUTPATIENT
Start: 2018-10-18 | End: 2019-10-10

## 2018-12-11 ENCOUNTER — HOSPITAL ENCOUNTER (OUTPATIENT)
Dept: BONE DENSITY | Facility: CLINIC | Age: 48
Discharge: HOME | End: 2018-12-11
Attending: INTERNAL MEDICINE | Admitting: INTERNAL MEDICINE
Payer: COMMERCIAL

## 2018-12-11 DIAGNOSIS — Z78.0 MENOPAUSE: ICD-10-CM

## 2018-12-11 PROCEDURE — 77080 DXA BONE DENSITY AXIAL: CPT

## 2018-12-31 ENCOUNTER — TELEPHONE (OUTPATIENT)
Dept: ONCOLOGY | Facility: CLINIC | Age: 48
End: 2018-12-31

## 2018-12-31 NOTE — TELEPHONE ENCOUNTER
Patient had dexa scan 12.11.18. Results show mild osteopenia; is also on arimidex.    Per Dr. Lazo, no change to plan of care. Remain on calcium 1200 and vitamin D 1000 mg, and keep f/u with Dr. Perry as scheduled.    Reviewed results and recommendations with patient 12.31.18. Denies questions or concerns at this time. Will call back if any arise. Direct line provided.

## 2019-04-15 DIAGNOSIS — Z85.3 PERSONAL HISTORY OF MALIGNANT NEOPLASM OF BREAST: ICD-10-CM

## 2019-04-15 LAB
ALBUMIN SERPL-MCNC: 3.8 G/DL (ref 3.4–5)
ALP SERPL-CCNC: 102 U/L (ref 40–150)
ALT SERPL W P-5'-P-CCNC: 31 U/L (ref 0–50)
ANION GAP SERPL CALCULATED.3IONS-SCNC: 1 MMOL/L (ref 3–14)
AST SERPL W P-5'-P-CCNC: 16 U/L (ref 0–45)
BASOPHILS # BLD AUTO: 0 10E9/L (ref 0–0.2)
BASOPHILS NFR BLD AUTO: 0.3 %
BILIRUB SERPL-MCNC: 1 MG/DL (ref 0.2–1.3)
BUN SERPL-MCNC: 17 MG/DL (ref 7–30)
CALCIUM SERPL-MCNC: 9 MG/DL (ref 8.5–10.1)
CANCER AG27-29 SERPL-ACNC: 26 U/ML (ref 0–39)
CHLORIDE SERPL-SCNC: 103 MMOL/L (ref 94–109)
CO2 SERPL-SCNC: 33 MMOL/L (ref 20–32)
CREAT SERPL-MCNC: 0.79 MG/DL (ref 0.52–1.04)
DIFFERENTIAL METHOD BLD: ABNORMAL
EOSINOPHIL # BLD AUTO: 0.1 10E9/L (ref 0–0.7)
EOSINOPHIL NFR BLD AUTO: 1.7 %
ERYTHROCYTE [DISTWIDTH] IN BLOOD BY AUTOMATED COUNT: 14.7 % (ref 10–15)
GFR SERPL CREATININE-BSD FRML MDRD: 86 ML/MIN/{1.73_M2}
GLUCOSE SERPL-MCNC: 82 MG/DL (ref 70–99)
HCT VFR BLD AUTO: 40.4 % (ref 35–47)
HGB BLD-MCNC: 13.3 G/DL (ref 11.7–15.7)
LYMPHOCYTES # BLD AUTO: 1.1 10E9/L (ref 0.8–5.3)
LYMPHOCYTES NFR BLD AUTO: 31.4 %
MCH RBC QN AUTO: 30 PG (ref 26.5–33)
MCHC RBC AUTO-ENTMCNC: 32.9 G/DL (ref 31.5–36.5)
MCV RBC AUTO: 91 FL (ref 78–100)
MONOCYTES # BLD AUTO: 0.5 10E9/L (ref 0–1.3)
MONOCYTES NFR BLD AUTO: 12.9 %
NEUTROPHILS # BLD AUTO: 1.9 10E9/L (ref 1.6–8.3)
NEUTROPHILS NFR BLD AUTO: 53.7 %
PLATELET # BLD AUTO: 206 10E9/L (ref 150–450)
POTASSIUM SERPL-SCNC: 3.9 MMOL/L (ref 3.4–5.3)
PROT SERPL-MCNC: 7.6 G/DL (ref 6.8–8.8)
RBC # BLD AUTO: 4.44 10E12/L (ref 3.8–5.2)
SODIUM SERPL-SCNC: 137 MMOL/L (ref 133–144)
WBC # BLD AUTO: 3.5 10E9/L (ref 4–11)

## 2019-04-15 PROCEDURE — 86300 IMMUNOASSAY TUMOR CA 15-3: CPT | Performed by: INTERNAL MEDICINE

## 2019-04-15 PROCEDURE — 85025 COMPLETE CBC W/AUTO DIFF WBC: CPT | Performed by: INTERNAL MEDICINE

## 2019-04-15 PROCEDURE — 80053 COMPREHEN METABOLIC PANEL: CPT | Performed by: INTERNAL MEDICINE

## 2019-04-15 PROCEDURE — 36415 COLL VENOUS BLD VENIPUNCTURE: CPT | Performed by: INTERNAL MEDICINE

## 2019-04-22 ENCOUNTER — ONCOLOGY VISIT (OUTPATIENT)
Dept: ONCOLOGY | Facility: CLINIC | Age: 49
End: 2019-04-22
Attending: INTERNAL MEDICINE
Payer: COMMERCIAL

## 2019-04-22 VITALS
OXYGEN SATURATION: 96 % | BODY MASS INDEX: 29.16 KG/M2 | SYSTOLIC BLOOD PRESSURE: 152 MMHG | TEMPERATURE: 98.1 F | RESPIRATION RATE: 20 BRPM | WEIGHT: 175 LBS | HEIGHT: 65 IN | HEART RATE: 71 BPM | DIASTOLIC BLOOD PRESSURE: 81 MMHG

## 2019-04-22 DIAGNOSIS — M85.80 OSTEOPENIA, UNSPECIFIED LOCATION: Primary | ICD-10-CM

## 2019-04-22 DIAGNOSIS — D72.819 LEUKOPENIA, UNSPECIFIED TYPE: ICD-10-CM

## 2019-04-22 DIAGNOSIS — Z12.31 BREAST CANCER SCREENING BY MAMMOGRAM: ICD-10-CM

## 2019-04-22 DIAGNOSIS — Z85.3 PERSONAL HISTORY OF MALIGNANT NEOPLASM OF BREAST: ICD-10-CM

## 2019-04-22 PROCEDURE — 99214 OFFICE O/P EST MOD 30 MIN: CPT | Performed by: INTERNAL MEDICINE

## 2019-04-22 PROCEDURE — G0463 HOSPITAL OUTPT CLINIC VISIT: HCPCS

## 2019-04-22 RX ORDER — VITAMIN E 268 MG
400 CAPSULE ORAL
COMMUNITY

## 2019-04-22 ASSESSMENT — MIFFLIN-ST. JEOR: SCORE: 1414.67

## 2019-04-22 ASSESSMENT — PAIN SCALES - GENERAL: PAINLEVEL: NO PAIN (0)

## 2019-04-22 NOTE — PATIENT INSTRUCTIONS
Dr. Perry would like you to have a Mammogram in August.     We would like to see you back in 1 year for a follow up appointment with labs prior.     When you are in need of a refill, please call your pharmacy and they will send us a request.      Copy of appointments, and after visit summary (AVS) given to patient.      If you have any questions please call Sonia Wild RN, BSN Oncology Hematology  [facility] [phone]. For questions after business hours, or on holidays/weekends, please call our after hours Nurse Triage line [phone]. Thank you.         Alberto MARTINS in Aug. 1 yr f/u with labs.

## 2019-04-22 NOTE — PROGRESS NOTES
CC: left breast cancer 2013 s/p lumpectomy, DD AC and wkly taxol, s/p RT, on AI now.        HISTORY OF PRESENT ILLNESS:  She presented at 43 MA 07/2013, identified heterogeneous breast and spiculated appearing mass + microcalcification.  She had a workup at [facility] initially.  Biopsy was done 08/2013, identified at 1 o'clock left breast, 2 cm from nipple fibroadenoma, 1 o'clock 10 cm from the nipple, invasive ductal cancer, grade 1, ER/MO 75% to 90% positive, HER-2/jose juan was negative, associated with DCIS, grade 1, without necrosis.    She subsequently went to [facility] to get surgical opinion from Dr. Kiran Kim and then went down to [facility].    Had a lumpectomy down there 10/03/2013, identified multifocal invasive ductal carcinoma with tubular features predominantly low nuclear grade 1.  The largest focus of invasive cancer 2.4 cm and second focus measured 1.4 cm, DCIS, low-grade, cribriform and micropapillary features, without necrosis.  Axillary lymph nodes, sentinel nodes #1 positive, the tumor focus measured 5 mm and has extranodal extension measures less than 1 mm.  Left axillary sentinel nodes #2 was negative.  Margin was negative.  She has pathologic T2 N1a invasive ductal cancer, multifocal disease, left breast cancer.   Staging PET has noise at surgical bed.   Oncotype RS is 22.       She made informed decision to proceed with DD AC then wkly taxol. C1D1 DD AC 11/26/2013. She went down to [place] and continued on the tx. She finished all the chemo in 4/2014.    She finished RT 7/2014. Tamoxifen is started then. LMP 12/2013. Tamoxifen was changed to Arimidex 4/2016.   Due to ongoing severe hot flushes and new joints pain, advice try Aromasin in 10/2018. Then back to Arimidex due to cost.        PAST MEDICAL HISTORY:  Hypothyroidism off Synthroid due to poor tolerance. PE in RLL and distal LLL in 1/2014 in [place] while on chemo s/p 6 months of coumadin till 7/2014.     FAMILY HISTORY:   "Not significant for any breast cancer or any malignancy.      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 Denies smoking.  She likes to drink, not every day.      REVIEW OF SYSTEMS:    Still has hot flushes, joints pain. She wants to touch it out.       PHYSICAL EXAMINATION   VITAL SIGNS:  Blood pressure 152/81, pulse 71, temperature 98.1  F (36.7  C), temperature source Tympanic, resp. rate 20, height 1.651 m (5' 5\"), weight 79.4 kg (175 lb), SpO2 96 %, not currently breastfeeding.     ECOG 0.     GENERAL APPEARANCE:  A young woman, looks like her stated age, not in acute distress.     HEENT: The patient is normocephalic, atraumatic. Pupils are equal react to light.  Sclerae are anicteric.  Moist oral mucosa.  Negative pharynx.  No oral thrush.   NECK:  Supple.  No jugular venous distention.  Thyroid is not palpable.   LYMPH NODES:  Superficial lymphadenopathy is not appreciable in the bilateral cervical, supraclavicular, axillary or inguinal adenopathy.   CARDIOVASCULAR:  S1, S2 regular with no murmurs or gallops.  No carotid or abdominal bruits. Port site is clear.  PULMONARY:  Lungs are clear to auscultation and percussion bilaterally.  There is no wheezing or rhonchi.   GASTROINTESTINAL:  Abdomen is soft, nontender.  No hepatosplenomegaly.  No signs of ascites.  No mass appreciable.   MUSCULOSKELETAL/EXTREMITIES:  No edema.  No cyanotic changes.  No signs of joint deformity.  No lymphedema.   NEUROLOGIC:  Cranial nerves II-XII are grossly intact.  Sensation intact.  Muscle strength and muscle tone symmetrical all through 5/5.   BACK:  No spinal or paraspinal tenderness.  No CVA tenderness.   SKIN:  No petechiae.  No rash.  No signs of cellulitis.   BREASTS:  Left breast has post RT changes. Scars are smooth.  chronic bilateral nipples retraction.  Right breast revealed no skin changes, no palpable lesions. She has dense breasts on both sides, left is more " than right.       CURRENT LAB DATAREVIWED   WBC 3.5 from 3.8 from  3.2 from 3.4 from 2.8, anc nl,   Marker/LFT are good    CURRENT IMAGE REVIEWED  dexa 12/2018 - mild osteopenia.   MA dx b/l 8/2018: negative      Old data reviewed with summary  Dexa from 12/2017 outside - osteopenia.CT BODY 4/2015:  1. No evidence of mets  2. sclerotic foci are unchanged dating back to 11/12/2013 which is supportive evidence for a benign entity.  3. Stable AMEYA pulmonary nodule stable, stable liver lesion and left adrenal changes consistent with benign etiology.   4. Fatty liver    CT 7/2014 - clearing of PE.  CT in [place] 1/2014 - PE in RLL and more distal LLL with subsegmental atelectasis, ? Pulmonary infarct.    PET 4/2014 - negative for activity.     MUGA 11/20-13 - EF 71%.   Thyroid sono 11/2013 - There is asymmetric enlargement of the right lobe of the gland which is heterogeneous and hyperemic. This can relate to an asymmetric goiter and/or thyroiditis. This accounts for the somewhat diffuse uptake in the right lobe of the thyroid gland on the recent PET/CT. More specific attention to the upper pole of the right lobe where slightly more focal activity is seen on the PET/CT shows no discrete nodule. There is an incidental small nodule in the lower pole of the right lobe of the thyroid gland with no convincing separate standout uptake on the PET/CT.     Oncotype dx RS 22, correspond to 10 yr distal rate of recurrence of 14% if only Tamoxifen , RT.    BRCA1 and BRCA2 test was negative from [facility] in 09/2013.       ASSESSMENT AND PLAN:    1. pathologic T2 N1a left breast cancer dx in premenopausally at age 43 years old in 2013.  She had T2N1a stage II disease. RS at 22.      She was offered DD AC then  taxol from 11/2013 to 4/2014.    She finished RT 7/2014.   She started on Tamoxifen summer 2014. LMP 12/2013.  Tamoxifen was changed to Arimidex 4/2016.       I shared with her the 10 yr data of anti hormone thearpy.  She will benefit from longer anti hormone therapy. I advice her to stay on it.      She is due 12 months f/u with labs  MA 3D is due in summer.       2. Hot flushes. She tried clodinine patch, effexor, acupuncture, nothing worked.    She is advised on behavior therapy.       3. Osteopenia - advice vit D and Ca and out door activity.   Discussed bisphosphonates use in adjuvant setting in postmenopausal women.   She is not interested.

## 2019-04-22 NOTE — LETTER
"    4/22/2019         RE: Lisset Lopes  [address]  [facility] [address]        Dear Colleague,    Thank you for referring your patient, Lisset Lopes, to the [facility]. Please see a copy of my visit note below.    Oncology Rooming Note    April 22, 2019 10:36 AM   Lisset Lopes is a 48 year old female who presents for:    Chief Complaint   Patient presents with     Oncology Clinic Visit     6 month recheck Personal history of malignant neoplasm of breast, review Labs & DEXA     Initial Vitals: /81 (BP Location: Right arm, Patient Position: Sitting, Cuff Size: Adult Regular)   Pulse 71   Temp 98.1  F (36.7  C) (Tympanic)   Resp 20   Ht 1.651 m (5' 5\")   Wt 79.4 kg (175 lb)   SpO2 96%   BMI 29.12 kg/m    Estimated body mass index is 29.12 kg/m  as calculated from the following:    Height as of this encounter: 1.651 m (5' 5\").    Weight as of this encounter: 79.4 kg (175 lb). Body surface area is 1.91 meters squared.  No Pain (0) Comment: Data Unavailable   No LMP recorded. (Menstrual status: Irregular Periods).  Allergies reviewed: Yes  Medications reviewed: Yes    Medications: MEDICATION REFILLS NEEDED TODAY. Provider was notified.  Pharmacy name entered into EPIC: [id number] - [place], [place] [address]    Clinical concerns:  6 month recheck Personal history of malignant neoplasm of breast, review Labs & DEXA    Jes Wright, FITO          '      CC: left breast cancer 2013 s/p lumpectomy, DD AC and wkly taxol, s/p RT, on AI now.        HISTORY OF PRESENT ILLNESS:  She presented at 43 MA 07/2013, identified heterogeneous breast and spiculated appearing mass + microcalcification.  She had a workup at [facility] initially.  Biopsy was done 08/2013, identified at 1 o'clock left breast, 2 cm from nipple fibroadenoma, 1 o'clock 10 cm from the nipple, invasive ductal cancer, grade 1, ER/AZ 75% to 90% positive, HER-2/jose juan was negative, " "associated with DCIS, grade 1, without necrosis.    She subsequently went to [facility] to get surgical opinion from Dr. Kiran Kim and then went down to [facility].    Had a lumpectomy down there 10/03/2013, identified multifocal invasive ductal carcinoma with tubular features predominantly low nuclear grade 1.  The largest focus of invasive cancer 2.4 cm and second focus measured 1.4 cm, DCIS, low-grade, cribriform and micropapillary features, without necrosis.  Axillary lymph nodes, sentinel nodes #1 positive, the tumor focus measured 5 mm and has extranodal extension measures less than 1 mm.  Left axillary sentinel nodes #2 was negative.  Margin was negative.  She has pathologic T2 N1a invasive ductal cancer, multifocal disease, left breast cancer.   Staging PET has noise at surgical bed.   Oncotype RS is 22.       She made informed decision to proceed with DD AC then wkly taxol. C1D1 DD AC 11/26/2013. She went down to [place] and continued on the tx. She finished all the chemo in 4/2014.    She finished RT 7/2014. Tamoxifen is started then. LMP 12/2013. Tamoxifen was changed to Arimidex 4/2016.   Due to ongoing severe hot flushes and new joints pain, advice try Aromasin in 10/2018. Then back to Arimidex due to cost.        PAST MEDICAL HISTORY:  Hypothyroidism off Synthroid due to poor tolerance. PE in RLL and distal LLL in 1/2014 in [place] while on chemo s/p 6 months of coumadin till 7/2014.     FAMILY HISTORY:  Not significant for any breast cancer or any malignancy.      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 Denies smoking.  She likes to drink, not every day.      REVIEW OF SYSTEMS:    Still has hot flushes, joints pain. She wants to touch it out.       PHYSICAL EXAMINATION   VITAL SIGNS:  Blood pressure 152/81, pulse 71, temperature 98.1  F (36.7  C), temperature source Tympanic, resp. rate 20, height 1.651 m (5' 5\"), weight 79.4 kg (175 lb), SpO2 96 " %, not currently breastfeeding.     ECOG 0.     GENERAL APPEARANCE:  A young woman, looks like her stated age, not in acute distress.     HEENT: The patient is normocephalic, atraumatic. Pupils are equal react to light.  Sclerae are anicteric.  Moist oral mucosa.  Negative pharynx.  No oral thrush.   NECK:  Supple.  No jugular venous distention.  Thyroid is not palpable.   LYMPH NODES:  Superficial lymphadenopathy is not appreciable in the bilateral cervical, supraclavicular, axillary or inguinal adenopathy.   CARDIOVASCULAR:  S1, S2 regular with no murmurs or gallops.  No carotid or abdominal bruits. Port site is clear.  PULMONARY:  Lungs are clear to auscultation and percussion bilaterally.  There is no wheezing or rhonchi.   GASTROINTESTINAL:  Abdomen is soft, nontender.  No hepatosplenomegaly.  No signs of ascites.  No mass appreciable.   MUSCULOSKELETAL/EXTREMITIES:  No edema.  No cyanotic changes.  No signs of joint deformity.  No lymphedema.   NEUROLOGIC:  Cranial nerves II-XII are grossly intact.  Sensation intact.  Muscle strength and muscle tone symmetrical all through 5/5.   BACK:  No spinal or paraspinal tenderness.  No CVA tenderness.   SKIN:  No petechiae.  No rash.  No signs of cellulitis.   BREASTS:  Left breast has post RT changes. Scars are smooth.  chronic bilateral nipples retraction.  Right breast revealed no skin changes, no palpable lesions. She has dense breasts on both sides, left is more than right.       CURRENT LAB DATAREVIWED   WBC 3.5 from 3.8 from  3.2 from 3.4 from 2.8, anc nl,   Marker/LFT are good    CURRENT IMAGE REVIEWED  dexa 12/2018 - mild osteopenia.   MA dx b/l 8/2018: negative      Old data reviewed with summary  Dexa from 12/2017 outside - osteopenia.CT BODY 4/2015:  1. No evidence of mets  2. sclerotic foci are unchanged dating back to 11/12/2013 which is supportive evidence for a benign entity.  3. Stable AMEYA pulmonary nodule stable, stable liver lesion and left adrenal  changes consistent with benign etiology.   4. Fatty liver    CT 7/2014 - clearing of PE.  CT in [place] 1/2014 - PE in RLL and more distal LLL with subsegmental atelectasis, ? Pulmonary infarct.    PET 4/2014 - negative for activity.     MUGA 11/20-13 - EF 71%.   Thyroid sono 11/2013 - There is asymmetric enlargement of the right lobe of the gland which is heterogeneous and hyperemic. This can relate to an asymmetric goiter and/or thyroiditis. This accounts for the somewhat diffuse uptake in the right lobe of the thyroid gland on the recent PET/CT. More specific attention to the upper pole of the right lobe where slightly more focal activity is seen on the PET/CT shows no discrete nodule. There is an incidental small nodule in the lower pole of the right lobe of the thyroid gland with no convincing separate standout uptake on the PET/CT.     Oncotype dx RS 22, correspond to 10 yr distal rate of recurrence of 14% if only Tamoxifen , RT.    BRCA1 and BRCA2 test was negative from [facility] in 09/2013.       ASSESSMENT AND PLAN:    1. pathologic T2 N1a left breast cancer dx in premenopausally at age 43 years old in 2013.  She had T2N1a stage II disease. RS at 22.      She was offered DD AC then  taxol from 11/2013 to 4/2014.    She finished RT 7/2014.   She started on Tamoxifen summer 2014. LMP 12/2013.  Tamoxifen was changed to Arimidex 4/2016.       I shared with her the 10 yr data of anti hormone thearpy. She will benefit from longer anti hormone therapy. I advice her to stay on it.      She is due 12 months f/u with labs  MA 3D is due in summer.       2. Hot flushes. She tried clodinine patch, effexor, acupuncture, nothing worked.    She is advised on behavior therapy.       3. Osteopenia - advice vit D and Ca and out door activity.   Discussed bisphosphonates use in adjuvant setting in postmenopausal women.   She is not interested.        Again, thank you for allowing me to participate in the care of  your patient.        Sincerely,        Clara Perry MD, MD

## 2019-04-22 NOTE — PROGRESS NOTES
"Oncology Rooming Note    April 22, 2019 10:36 AM   Lisset Lopes is a 48 year old female who presents for:    Chief Complaint   Patient presents with     Oncology Clinic Visit     6 month recheck Personal history of malignant neoplasm of breast, review Labs & DEXA     Initial Vitals: /81 (BP Location: Right arm, Patient Position: Sitting, Cuff Size: Adult Regular)   Pulse 71   Temp 98.1  F (36.7  C) (Tympanic)   Resp 20   Ht 1.651 m (5' 5\")   Wt 79.4 kg (175 lb)   SpO2 96%   BMI 29.12 kg/m   Estimated body mass index is 29.12 kg/m  as calculated from the following:    Height as of this encounter: 1.651 m (5' 5\").    Weight as of this encounter: 79.4 kg (175 lb). Body surface area is 1.91 meters squared.  No Pain (0) Comment: Data Unavailable   No LMP recorded. (Menstrual status: Irregular Periods).  Allergies reviewed: Yes  Medications reviewed: Yes    Medications: MEDICATION REFILLS NEEDED TODAY. Provider was notified.  Pharmacy name entered into EPIC: [id number] - [place], [place] - [address]    Clinical concerns:  6 month recheck Personal history of malignant neoplasm of breast, review Labs & DEXA    Jes Wright CMA          '    "

## 2019-08-06 ENCOUNTER — HOSPITAL ENCOUNTER (OUTPATIENT)
Dept: MAMMOGRAPHY | Facility: CLINIC | Age: 49
Discharge: HOME | End: 2019-08-06
Attending: INTERNAL MEDICINE | Admitting: INTERNAL MEDICINE
Payer: COMMERCIAL

## 2019-08-06 DIAGNOSIS — Z12.31 BREAST CANCER SCREENING BY MAMMOGRAM: ICD-10-CM

## 2019-08-06 PROCEDURE — 77063 BREAST TOMOSYNTHESIS BI: CPT

## 2019-10-10 DIAGNOSIS — Z85.3 PERSONAL HISTORY OF MALIGNANT NEOPLASM OF BREAST: ICD-10-CM

## 2019-10-10 RX ORDER — ANASTROZOLE 1 MG/1
1 TABLET ORAL DAILY
Qty: 90 TABLET | Refills: 3 | Status: SHIPPED | OUTPATIENT
Start: 2019-10-10 | End: 2020-04-14

## 2019-10-10 NOTE — PROGRESS NOTES
Fax received from [facility] requesting a refill of anastrazole on behalf of pt.  Last refill: 10.18.18  # 90 with 3 refills at [facility].  Last office visit:  04.22.19  Next office visit:  04.21.20    This is an appropriate refill, and has been e-prescribed. Sonia Wild, RN, BSN, OCN

## 2020-03-02 ENCOUNTER — HEALTH MAINTENANCE LETTER (OUTPATIENT)
Age: 50
End: 2020-03-02

## 2020-04-03 ENCOUNTER — TELEPHONE (OUTPATIENT)
Dept: LAB | Facility: CLINIC | Age: 50
End: 2020-04-03

## 2020-04-03 NOTE — TELEPHONE ENCOUNTER
Patient has lab only appointment scheduled for 04/04/2020.   Due to current coronavirus pandemic, please consider whether these lab tests can be deferred.     Please open ORDER REVIEW, review orders, and then confirm or defer orders ASAP.  Enter <dot>keep or <dot>defer smart phrase into NOTES, complete documentation, and route encounter.  [facility]: /team  [facility]: individual provider pool  [facility] primary care:   [facility] specialty: scheduling pool

## 2020-04-07 DIAGNOSIS — Z85.3 PERSONAL HISTORY OF MALIGNANT NEOPLASM OF BREAST: ICD-10-CM

## 2020-04-07 LAB
ALBUMIN SERPL-MCNC: 4 G/DL (ref 3.4–5)
ALP SERPL-CCNC: 97 U/L (ref 40–150)
ALT SERPL W P-5'-P-CCNC: 48 U/L (ref 0–50)
ANION GAP SERPL CALCULATED.3IONS-SCNC: 2 MMOL/L (ref 3–14)
AST SERPL W P-5'-P-CCNC: 23 U/L (ref 0–45)
BASOPHILS # BLD AUTO: 0 10E9/L (ref 0–0.2)
BASOPHILS NFR BLD AUTO: 0.2 %
BILIRUB SERPL-MCNC: 0.8 MG/DL (ref 0.2–1.3)
BUN SERPL-MCNC: 14 MG/DL (ref 7–30)
CALCIUM SERPL-MCNC: 9.6 MG/DL (ref 8.5–10.1)
CANCER AG27-29 SERPL-ACNC: 18 U/ML (ref 0–39)
CHLORIDE SERPL-SCNC: 104 MMOL/L (ref 94–109)
CO2 SERPL-SCNC: 33 MMOL/L (ref 20–32)
CREAT SERPL-MCNC: 0.75 MG/DL (ref 0.52–1.04)
DIFFERENTIAL METHOD BLD: NORMAL
EOSINOPHIL # BLD AUTO: 0.1 10E9/L (ref 0–0.7)
EOSINOPHIL NFR BLD AUTO: 1.2 %
ERYTHROCYTE [DISTWIDTH] IN BLOOD BY AUTOMATED COUNT: 13.7 % (ref 10–15)
GFR SERPL CREATININE-BSD FRML MDRD: >90 ML/MIN/{1.73_M2}
GLUCOSE SERPL-MCNC: 73 MG/DL (ref 70–99)
HCT VFR BLD AUTO: 42.3 % (ref 35–47)
HGB BLD-MCNC: 13.8 G/DL (ref 11.7–15.7)
LYMPHOCYTES # BLD AUTO: 1.4 10E9/L (ref 0.8–5.3)
LYMPHOCYTES NFR BLD AUTO: 28.1 %
MCH RBC QN AUTO: 29.1 PG (ref 26.5–33)
MCHC RBC AUTO-ENTMCNC: 32.6 G/DL (ref 31.5–36.5)
MCV RBC AUTO: 89 FL (ref 78–100)
MONOCYTES # BLD AUTO: 0.5 10E9/L (ref 0–1.3)
MONOCYTES NFR BLD AUTO: 10.4 %
NEUTROPHILS # BLD AUTO: 3 10E9/L (ref 1.6–8.3)
NEUTROPHILS NFR BLD AUTO: 60.1 %
PLATELET # BLD AUTO: 202 10E9/L (ref 150–450)
POTASSIUM SERPL-SCNC: 4.8 MMOL/L (ref 3.4–5.3)
PROT SERPL-MCNC: 7.7 G/DL (ref 6.8–8.8)
RBC # BLD AUTO: 4.74 10E12/L (ref 3.8–5.2)
SODIUM SERPL-SCNC: 139 MMOL/L (ref 133–144)
WBC # BLD AUTO: 4.9 10E9/L (ref 4–11)

## 2020-04-07 PROCEDURE — 85025 COMPLETE CBC W/AUTO DIFF WBC: CPT | Performed by: INTERNAL MEDICINE

## 2020-04-07 PROCEDURE — 86300 IMMUNOASSAY TUMOR CA 15-3: CPT | Performed by: INTERNAL MEDICINE

## 2020-04-07 PROCEDURE — 36415 COLL VENOUS BLD VENIPUNCTURE: CPT | Performed by: INTERNAL MEDICINE

## 2020-04-07 PROCEDURE — 80053 COMPREHEN METABOLIC PANEL: CPT | Performed by: INTERNAL MEDICINE

## 2020-04-14 ENCOUNTER — VIRTUAL VISIT (OUTPATIENT)
Dept: ONCOLOGY | Facility: CLINIC | Age: 50
End: 2020-04-14
Attending: INTERNAL MEDICINE
Payer: COMMERCIAL

## 2020-04-14 DIAGNOSIS — Z78.0 MENOPAUSE: ICD-10-CM

## 2020-04-14 DIAGNOSIS — Z12.31 SCREENING MAMMOGRAM, ENCOUNTER FOR: Primary | ICD-10-CM

## 2020-04-14 DIAGNOSIS — N95.1 HOT FLUSHES, PERIMENOPAUSAL: ICD-10-CM

## 2020-04-14 DIAGNOSIS — M85.80 OSTEOPENIA, UNSPECIFIED LOCATION: ICD-10-CM

## 2020-04-14 DIAGNOSIS — Z85.3 PERSONAL HISTORY OF MALIGNANT NEOPLASM OF BREAST: ICD-10-CM

## 2020-04-14 PROCEDURE — 99214 OFFICE O/P EST MOD 30 MIN: CPT | Mod: 95 | Performed by: INTERNAL MEDICINE

## 2020-04-14 RX ORDER — ANASTROZOLE 1 MG/1
1 TABLET ORAL DAILY
Qty: 90 TABLET | Refills: 3 | Status: SHIPPED | OUTPATIENT
Start: 2020-04-14 | End: 2022-04-11

## 2020-04-14 NOTE — PROGRESS NOTES
"Lisset Lopes is a 49 year old female who is being evaluated via a billable video visit.      The patient has been notified of following:     \"This video visit will be conducted via a call between you and your physician/provider. We have found that certain health care needs can be provided without the need for an in-person physical exam.  This service lets us provide the care you need with a video conversation.  If a prescription is necessary we can send it directly to your pharmacy.  If lab work is needed we can place an order for that and you can then stop by our lab to have the test done at a later time.    Video visits are billed at different rates depending on your insurance coverage.  Please reach out to your insurance provider with any questions.    If during the course of the call the physician/provider feels a video visit is not appropriate, you will not be charged for this service.\"    Patient has given verbal consent for Video visit? Yes.     How would you like to obtain your AVS? Cathy    Patient would like the video invitation sent by: [phone]      Video Start Time: 10:56 AM          Video-Visit Details    Type of service:  Video Visit    Video End Time (time video stopped): 11:30 am    Originating Location (pt. Location): Home    Distant Location (provider location):  [facility]     Mode of Communication:  Video Conference via Shan Wright CMA      "

## 2020-04-14 NOTE — PROGRESS NOTES
Oncology follow-up visit    Video visit    CC: left breast cancer 2013 s/p lumpectomy, DD AC and wkly taxol, s/p RT, on AI now.        HISTORY OF ONCOLOGY ILLNESS:  She presented at age 43 MA 07/2013, identified heterogeneous breast and spiculated appearing mass + microcalcification.  She had a workup at [facility] initially.  Biopsy was done 08/2013, identified at 1 o'clock left breast, 2 cm from nipple fibroadenoma, 1 o'clock 10 cm from the nipple, invasive ductal cancer, grade 1, ER/OR 75% to 90% positive, HER-2/jose juan was negative, associated with DCIS, grade 1, without necrosis.    She subsequently went to [facility] to get surgical opinion from Dr. Kiran Kim and then went down to [facility].    Had a lumpectomy down there 10/03/2013, identified multifocal invasive ductal carcinoma with tubular features predominantly low nuclear grade 1.  The largest focus of invasive cancer 2.4 cm and second focus measured 1.4 cm, DCIS, low-grade, cribriform and micropapillary features, without necrosis.  Axillary lymph nodes, sentinel nodes #1 positive, the tumor focus measured 5 mm and has extranodal extension measures less than 1 mm.  Left axillary sentinel nodes #2 was negative.  Margin was negative.  She has pathologic T2 N1a invasive ductal cancer, multifocal disease, left breast cancer.   Staging PET has noise at surgical bed.   Oncotype RS is 22.       She made informed decision to proceed with DD AC then wkly taxol. C1D1 DD AC 11/26/2013. She went down to [place] and continued on the tx. She finished all the chemo in 4/2014.      She finished RT 7/2014.   Tamoxifen is started then. LMP 12/2013. Tamoxifen was changed to Arimidex 4/2016.   Due to ongoing severe hot flushes and new joints pain, advice try Aromasin in 10/2018. Then back to Arimidex due to cost.       INTERVAL HISTORY:  Since the patient's last visit with us, there is no hospital stay/surgery/new diagnosis made.       PAST MEDICAL HISTORY:   Hypothyroidism off Synthroid due to poor tolerance. PE in RLL and distal LLL in 2014 in [place] while on chemo s/p 6 months of coumadin till 2014.     FAMILY HISTORY:  Not significant for any breast cancer or any malignancy.      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 Denies smoking.  She likes to drink, not every day.        REVIEW OF SYSTEMS:    GENERAL: pt is in usual state of health.  No B symptoms. Still has hot flushes, joints pain. She wants to touch it out.   NEURO:   No headache, double vision, or focal weakness.  No neuropathy.   SKIN:  No chronic skin rash or skin infection.   CARDIOVASCULAR:  No High blood pressure or hyperlipidemia. NO VALENZUELA  PULMONARY:  No shortness of breath, no pleurisy, no cough, no hemoptysis.   GI:  No abdominal pain, nausea, vomiting, constipation.  No diarrhea.  No bright red blood per rectum.   :  No urgency, frequency.  No recurrent urinary tract infection.  No kidney problems.   RHEUMATOLOGY/MUSCULOSKELETAL SYSTEM:  no arthritic pain, or muscle pain. No muscle ache.   ENDOCRINE:  No history of diabetes. Hypothyroidism off Synthroid due to poor tolerance.  No complaints of hot flashes.   HEMATOLOGY: PE in RLL and distal LLL in 2014 in [place] while on chemo s/p 6 months of coumadin till 2014.  Oncology: Cancer in 2013 currently on AI therapy.  IMMUNOLOGY:  No recurrent fever or chills episode.  No recurrent infectious episode.   BREASTS/GYN: No breast complains or vaginal dryness  PSYCHIATRY:  No anxiety or depression.         PHYSICAL EXAMINATION DURING VIDEO VISIT:  Pt looks like stated age, very pleasant, not in acute distress.     ECO  Neuro: Oriented to time, person, and places.  ENT, MOUTH: Pupils are equal.  Sclerae are anicteric.  Moist oral mucosa. No oral thrush.   NECK:  No jugular venous distention.    Respiratory: talk nl, no sob during conversation.  MUSCULOSKELETAL/EXTREMITIES:  No edema.  No cyanotic  changes.  No signs of joint deformity. Normal range of motion  SKIN:  No petechiae.  No rash.  No signs of cellulitis.   PSYCHIATRIC: Oriented to time, person, and places. Normal mood and affect. Good memory. Proper insight and judgement.       CURRENT LAB DATAREVIWED TODAY DISCUSSED WITH PATIENT TODAY DURING THE VIDEO VISIT:  Cbcdiff, cmp, marker are fine      CURRENT IMAGE REVIEWED DISCUSSED WITH PATIENT TODAY DURING THE VIDEO VISIT:  MA dx b/l 8/2019: negative    dexa 12/2018 - mild osteopenia.       Old data reviewed with summary  Dexa from 12/2017 outside - osteopenia.CT BODY 4/2015:  1. No evidence of mets  2. sclerotic foci are unchanged dating back to 11/12/2013 which is supportive evidence for a benign entity.  3. Stable AMEYA pulmonary nodule stable, stable liver lesion and left adrenal changes consistent with benign etiology.   4. Fatty liver    CT 7/2014 - clearing of PE.  CT in [place] 1/2014 - PE in RLL and more distal LLL with subsegmental atelectasis, ? Pulmonary infarct.    PET 4/2014 - negative for activity.     Oncotype dx RS 22, correspond to 10 yr distal rate of recurrence of 14% if only Tamoxifen , RT.    BRCA1 and BRCA2 test was negative from [facility] in 09/2013.       ASSESSMENT AND PLAN DISCUSSED WITH PATIENT TODAY DURING THE VIDEO VISIT:   1. pathologic T2 N1a left breast cancer dx in premenopausally at age 43 years old in 2013.  She had T2N1a stage II disease. RS at 22.      She was offered DD AC then  taxol from 11/2013 to 4/2014.    She finished RT 7/2014.   She started on Tamoxifen summer 2014. LMP 12/2013.  Tamoxifen was changed to Arimidex 4/2016.     I shared with her the 10 yr data of anti hormone thearpy. She will benefit from longer anti hormone therapy. I advice her to stay on it.      She has concern about her bone health.  I informed her if that is, concern we can always switch her back to tamoxifen.    She is due 12 months f/u with labs  MA 3D is due in summer.        2. Hot flushes. She tried clodinine patch, effexor, acupuncture, nothing worked.    She is advised on behavior therapy.   He reports this is stable.      3. Osteopenia - advice vit D and Ca and out door activity.   Discussed bisphosphonates use in adjuvant setting in postmenopausal women. She is not interested.  She is due repeat DEXA scan end of year.      Total time spent 25 minutes, spent in counseling regarding her disease status, longer antihormone therapy treatment data, pros and cons of it, bone health concerns, further plan of action.    I have reviewed the note as documented above.  This accurately captures the substance of my video visit with the patient.

## 2020-08-07 ENCOUNTER — HOSPITAL ENCOUNTER (OUTPATIENT)
Dept: MAMMOGRAPHY | Facility: CLINIC | Age: 50
Discharge: HOME | End: 2020-08-07
Attending: INTERNAL MEDICINE | Admitting: INTERNAL MEDICINE
Payer: COMMERCIAL

## 2020-08-07 DIAGNOSIS — Z12.31 SCREENING MAMMOGRAM, ENCOUNTER FOR: ICD-10-CM

## 2020-08-07 PROCEDURE — 77067 SCR MAMMO BI INCL CAD: CPT

## 2020-12-15 ENCOUNTER — HOSPITAL ENCOUNTER (OUTPATIENT)
Dept: BONE DENSITY | Facility: CLINIC | Age: 50
Discharge: HOME | End: 2020-12-15
Attending: INTERNAL MEDICINE | Admitting: INTERNAL MEDICINE
Payer: COMMERCIAL

## 2020-12-15 DIAGNOSIS — Z78.0 MENOPAUSE: ICD-10-CM

## 2020-12-15 PROCEDURE — 77080 DXA BONE DENSITY AXIAL: CPT

## 2020-12-16 NOTE — RESULT ENCOUNTER NOTE
Spoke with patient regarding osteopenia and continuing Vit D. Patient verbalized understanding and has a follow up scheduled in April 2021.     Aixa Gill RN BSN

## 2021-04-18 ENCOUNTER — HEALTH MAINTENANCE LETTER (OUTPATIENT)
Age: 51
End: 2021-04-18

## 2021-04-27 ENCOUNTER — TELEPHONE (OUTPATIENT)
Dept: ONCOLOGY | Facility: CLINIC | Age: 51
End: 2021-04-27

## 2021-04-27 NOTE — TELEPHONE ENCOUNTER
----- Message from Tawana Abel sent at 4/27/2021  1:36 PM CDT -----  Regarding: FW: fu  Patient is scheduled to see someone at [facility] for follow up. She want to be contacted once we get an MD in house so she can come back to us.  Thanks.  Tawana  ----- Message -----  From: Guanako Wild RN  Sent: 4/21/2021  11:43 AM CDT  To: [facility]   Subject: fu                                               Pt due for 1 yr follow up and labs.     Thank you  Guanako        no tingling/no vomiting

## 2021-08-09 ENCOUNTER — HOSPITAL ENCOUNTER (OUTPATIENT)
Dept: MAMMOGRAPHY | Facility: CLINIC | Age: 51
Discharge: HOME | End: 2021-08-09
Attending: FAMILY MEDICINE | Admitting: FAMILY MEDICINE
Payer: COMMERCIAL

## 2021-08-09 DIAGNOSIS — Z12.31 VISIT FOR SCREENING MAMMOGRAM: ICD-10-CM

## 2021-08-09 PROCEDURE — 77063 BREAST TOMOSYNTHESIS BI: CPT

## 2021-08-26 ENCOUNTER — MEDICAL CORRESPONDENCE (OUTPATIENT)
Dept: HEALTH INFORMATION MANAGEMENT | Facility: CLINIC | Age: 51
End: 2021-08-26

## 2021-08-31 ENCOUNTER — TELEPHONE (OUTPATIENT)
Dept: ENDOCRINOLOGY | Facility: CLINIC | Age: 51
End: 2021-08-31

## 2021-08-31 NOTE — TELEPHONE ENCOUNTER
Patient sent a letter to Dr Rossi because her primary wanted her to follow up back with endocrinologist with an appointment regarding what the provider's follow up instructions were from thyroid biopsy done 4/2016. Writer huddled with nurse yesterday, recommend new patient appointment to follow up and further discuss with provider. Patient contacted today and patient prefer verbally telling her what Dr Rossi  recommends after her previous biopsy 4/2016 so she can relay back to her primary. Patient informed of nurse recommendation and relayed some of the provider mychart messages previously with patient per patient request regarding plan.     Patient plans to discuss with  then decide if she will call back to make appointment with specialist or follow up with her primary instead.    GONZALEZ Rossi. Letter to scanning.    Rudy Dominguez [facility]  Adult Endocrinology  [facility]

## 2021-09-02 NOTE — TELEPHONE ENCOUNTER
"Per Dr Rossi: \"Recommend repeating ultrasound thyroid to monitor stability.   She can follow up with her PCP too. If PCP wants a referral afterward, I can see her for visit.\"    Patient contacted and informed of provider recommendation and states verbal understanding.    Rudy Dominguez [facility]  Adult Endocrinology  [facility]    "

## 2021-09-09 ENCOUNTER — TRANSFERRED RECORDS (OUTPATIENT)
Dept: HEALTH INFORMATION MANAGEMENT | Facility: CLINIC | Age: 51
End: 2021-09-09

## 2021-10-03 ENCOUNTER — HEALTH MAINTENANCE LETTER (OUTPATIENT)
Age: 51
End: 2021-10-03

## 2022-04-06 ENCOUNTER — DOCUMENTATION ONLY (OUTPATIENT)
Dept: LAB | Facility: CLINIC | Age: 52
End: 2022-04-06

## 2022-04-06 ENCOUNTER — LAB (OUTPATIENT)
Dept: LAB | Facility: CLINIC | Age: 52
End: 2022-04-06
Payer: COMMERCIAL

## 2022-04-06 DIAGNOSIS — Z85.3 PERSONAL HISTORY OF MALIGNANT NEOPLASM OF BREAST: ICD-10-CM

## 2022-04-06 DIAGNOSIS — Z85.3 PERSONAL HISTORY OF MALIGNANT NEOPLASM OF BREAST: Primary | ICD-10-CM

## 2022-04-06 LAB
ALBUMIN SERPL-MCNC: 4 G/DL (ref 3.4–5)
ALP SERPL-CCNC: 77 U/L (ref 40–150)
ALT SERPL W P-5'-P-CCNC: 33 U/L (ref 0–50)
ANION GAP SERPL CALCULATED.3IONS-SCNC: 5 MMOL/L (ref 3–14)
AST SERPL W P-5'-P-CCNC: 16 U/L (ref 0–45)
BASOPHILS # BLD AUTO: 0 10E3/UL (ref 0–0.2)
BASOPHILS NFR BLD AUTO: 0 %
BILIRUB SERPL-MCNC: 1.1 MG/DL (ref 0.2–1.3)
BUN SERPL-MCNC: 16 MG/DL (ref 7–30)
CALCIUM SERPL-MCNC: 9.4 MG/DL (ref 8.5–10.1)
CHLORIDE BLD-SCNC: 106 MMOL/L (ref 94–109)
CO2 SERPL-SCNC: 31 MMOL/L (ref 20–32)
CREAT SERPL-MCNC: 0.96 MG/DL (ref 0.52–1.04)
EOSINOPHIL # BLD AUTO: 0.1 10E3/UL (ref 0–0.7)
EOSINOPHIL NFR BLD AUTO: 2 %
ERYTHROCYTE [DISTWIDTH] IN BLOOD BY AUTOMATED COUNT: 14.1 % (ref 10–15)
GFR SERPL CREATININE-BSD FRML MDRD: 70 ML/MIN/1.73M2
GLUCOSE BLD-MCNC: 94 MG/DL (ref 70–99)
HCT VFR BLD AUTO: 42.5 % (ref 35–47)
HGB BLD-MCNC: 13.6 G/DL (ref 11.7–15.7)
LYMPHOCYTES # BLD AUTO: 1.1 10E3/UL (ref 0.8–5.3)
LYMPHOCYTES NFR BLD AUTO: 32 %
MCH RBC QN AUTO: 29.6 PG (ref 26.5–33)
MCHC RBC AUTO-ENTMCNC: 32 G/DL (ref 31.5–36.5)
MCV RBC AUTO: 93 FL (ref 78–100)
MONOCYTES # BLD AUTO: 0.3 10E3/UL (ref 0–1.3)
MONOCYTES NFR BLD AUTO: 10 %
NEUTROPHILS # BLD AUTO: 1.8 10E3/UL (ref 1.6–8.3)
NEUTROPHILS NFR BLD AUTO: 55 %
PLATELET # BLD AUTO: 184 10E3/UL (ref 150–450)
POTASSIUM BLD-SCNC: 4.5 MMOL/L (ref 3.4–5.3)
PROT SERPL-MCNC: 7.5 G/DL (ref 6.8–8.8)
RBC # BLD AUTO: 4.59 10E6/UL (ref 3.8–5.2)
SODIUM SERPL-SCNC: 142 MMOL/L (ref 133–144)
WBC # BLD AUTO: 3.3 10E3/UL (ref 4–11)

## 2022-04-06 PROCEDURE — 36415 COLL VENOUS BLD VENIPUNCTURE: CPT

## 2022-04-06 PROCEDURE — 80053 COMPREHEN METABOLIC PANEL: CPT

## 2022-04-06 PROCEDURE — 85025 COMPLETE CBC W/AUTO DIFF WBC: CPT

## 2022-04-11 ENCOUNTER — ONCOLOGY VISIT (OUTPATIENT)
Dept: ONCOLOGY | Facility: CLINIC | Age: 52
End: 2022-04-11
Attending: FAMILY MEDICINE
Payer: COMMERCIAL

## 2022-04-11 VITALS
RESPIRATION RATE: 12 BRPM | OXYGEN SATURATION: 100 % | WEIGHT: 167 LBS | HEART RATE: 68 BPM | BODY MASS INDEX: 27.79 KG/M2 | SYSTOLIC BLOOD PRESSURE: 147 MMHG | DIASTOLIC BLOOD PRESSURE: 72 MMHG | TEMPERATURE: 97.2 F

## 2022-04-11 DIAGNOSIS — M85.80 OSTEOPENIA, UNSPECIFIED LOCATION: Primary | ICD-10-CM

## 2022-04-11 DIAGNOSIS — Z85.3 PERSONAL HISTORY OF MALIGNANT NEOPLASM OF BREAST: ICD-10-CM

## 2022-04-11 PROCEDURE — 99214 OFFICE O/P EST MOD 30 MIN: CPT | Performed by: INTERNAL MEDICINE

## 2022-04-11 PROCEDURE — G0463 HOSPITAL OUTPT CLINIC VISIT: HCPCS

## 2022-04-11 RX ORDER — LEVOTHYROXINE SODIUM 88 UG/1
TABLET ORAL
COMMUNITY
Start: 2022-02-19

## 2022-04-11 RX ORDER — ANASTROZOLE 1 MG/1
1 TABLET ORAL DAILY
Qty: 90 TABLET | Refills: 3 | Status: SHIPPED | OUTPATIENT
Start: 2022-04-11 | End: 2023-03-08

## 2022-04-11 ASSESSMENT — PAIN SCALES - GENERAL: PAINLEVEL: NO PAIN (0)

## 2022-04-11 NOTE — PROGRESS NOTES
"Oncology Rooming Note    April 11, 2022 11:30 AM   Lisset Lopes is a 51 year old female who presents for:    Chief Complaint   Patient presents with     Oncology Clinic Visit     Malignant neoplasm of upper-outer quadrant of female breast - Provider visit only     Initial Vitals: BP (!) 147/72 (BP Location: Right arm, Patient Position: Sitting, Cuff Size: Adult Regular)   Pulse 68   Temp 97.2  F (36.2  C) (Tympanic)   Resp 12   Wt 75.8 kg (167 lb)   SpO2 100%   BMI 27.79 kg/m   Estimated body mass index is 27.79 kg/m  as calculated from the following:    Height as of 4/22/19: 1.651 m (5' 5\").    Weight as of this encounter: 75.8 kg (167 lb). Body surface area is 1.86 meters squared.  No Pain (0) Comment: Data Unavailable   No LMP recorded. (Menstrual status: Irregular Periods).  Allergies reviewed: Yes  Medications reviewed: Yes    Medications: Medication refills not needed today.  Pharmacy name entered into EPIC: [id number] [address]    Clinical concerns:  None      Rachelle Boucher CMA            "

## 2022-04-11 NOTE — PROGRESS NOTES
[facility] Hematology and Oncology Progress Note    Patient: Lisset Lopes  MRN: [id number]  Date of Service: 04/11/2022        Reason for Visit    Chief Complaint   Patient presents with     Oncology Clinic Visit     Malignant neoplasm of upper-outer quadrant of female breast - Provider visit only         Problem List Items Addressed This Visit    None     Visit Diagnoses     Osteopenia, unspecified location    -  Primary    Relevant Orders    DX Hip/Pelvis/Spine    Personal history of malignant neoplasm of breast        Relevant Medications    anastrozole (ARIMIDEX) 1 MG tablet    Other Relevant Orders    MA Screen Bilateral w/Wes    DX Hip/Pelvis/Spine            Assessment and Plan    Early stage breast cancer, stage IIB, status post lumpectomy, radiation, adjuvant chemotherapy with ACT, currently on endocrine therapy  Doing well on Arimidex.  No major side effect so far.  She is doing an extended 10-year endocrine therapy for node positive breast cancer.  Reviewed the data behind this.  It has shown to improve disease-free survival or recurrence for survival but has never shown to improve overall survival. Is almost finishing up 10 years sometime next year, so plan is to continue Arimidex for now.  He is due for a mammogram and a DEXA scan this year.  We will schedule this in August.  I will see her back in 1 year.  The labs from last week.  Total white count is 3.3.  Hemoglobin is normal at 13.6.  Blood count is 194.  MCV is normal at 93.  Electrolytes are within normal limits.  Creatinine is 0.96.  LFTs are within normal limits.  She was getting labs periodically.  I explained to her that there is no indications to do routine labs during breast cancer surveillance as it has never shown to improve cancer detection rate.  She is comfortable discontinuing this.    Continue calcium and vitamin D for osteopenia.  If her DEXA scan shows osteoporosis then will initiate bisphosphonate  therapy.    Also it is noted that she has had chronic leukopenia since her chemotherapy.  Other counts are normal.  No concerns for any primary bone marrow issue at this point in time.  And she is asymptomatic.  I might consider doing a CBC once a year if needed.    Cancer Staging  Malignant neoplasm of upper-outer quadrant of female breast (H)  Staging form: Breast, AJCC 7th Edition  - Pathologic: Stage IIB (T2(2), N1a, cM0) - Signed by Linda Neal MD on 4/11/2022  ER Status: Positive  PA Status: Positive  HER2 Status: Negative      ECOG Performance    0 - Independent         Problem List    Patient Active Problem List   Diagnosis     Malignant neoplasm (H)     PE (pulmonary embolism)     Malignant neoplasm of upper-outer quadrant of female breast (H)        Oncology history  She presented at age 43 MA 07/2013, identified heterogeneous breast and spiculated appearing mass + microcalcification.  She had a workup at [facility] initially.  Biopsy was done 08/2013, identified at 1 o'clock left breast, 2 cm from nipple fibroadenoma, 1 o'clock 10 cm from the nipple, invasive ductal cancer, grade 1, ER/PA 75% to 90% positive, HER-2/jose juan was negative, associated with DCIS, grade 1, without necrosis.      Had a lumpectomy down there 10/03/2013, identified multifocal invasive ductal carcinoma with tubular features predominantly low nuclear grade 1.  The largest focus of invasive cancer 2.4 cm and second focus measured 1.4 cm, DCIS, low-grade, cribriform and micropapillary features, without necrosis.      Axillary lymph nodes, sentinel nodes #1 positive, the tumor focus measured 5 mm and has extranodal extension measures less than 1 mm.  Left axillary sentinel nodes #2 was negative.  Margin was negative.  She has pathologic T2 N1a invasive ductal cancer, multifocal disease, left breast cancer.     Staging PET has noise at surgical bed.     Oncotype RS was 22.         She made informed decision to  proceed with DD AC then wkly taxol. C1D1 DD AC 11/26/2013. She went down to [place] and continued on the tx. She finished all the chemo in 4/2014.        She finished RT 7/2014.     Tamoxifen is started then. LMP 12/2013. Tamoxifen was changed to Arimidex 4/2016.   Due to ongoing severe hot flushes and new joints pain, advice try Aromasin in 10/2018. Then back to Arimidex due to cost.     Interval History   Lisset Lopes is a 51 year old with early stage breast cancer s/p lumpectomy followed by chemotherapy and radiation.  Currently on hormonal therapy with Arimidex who is here for follow-up.    Doing well on treatment.  She is almost 9 years into endocrine therapy.  Does have some hot flashes.  But denies any other significant side effects.  No new bone pain.  Has not noticed any new breast lumps or masses.    Is reactive.  Stable weight.  Last mammogram was in August 2021.        Review of Systems  A comprehensive review of systems was negative except for what is noted in the interval history    Current Outpatient Medications   Medication     anastrozole (ARIMIDEX) 1 MG tablet     Cholecalciferol (VITAMIN D3) 1000 units CAPS     levothyroxine (SYNTHROID/LEVOTHROID) 88 MCG tablet     ARMOUR THYROID 60 MG tablet     Pyridoxine HCl (VITAMIN B6) 200 MG TABS     vitamin E (TOCOPHEROL) 400 units (180 mg) capsule     No current facility-administered medications for this visit.        Physical Exam    No flowsheet data found.    General: alert and cooperative  HEENT: Head: Normal, normocephalic, atraumatic.  Eye: Normal external eye, conjunctiva, lids cornea, KIMBERLY.  Chest: Clear to auscultation bilaterally  Cardiac: S1, S2 normal, regular rate and rhythm  Abdomen: abdomen is soft without significant tenderness, masses, organomegaly or guarding  Extremities: atraumatic, no peripheral edema  Skin:   CNS: Alert and oriented x3, neurologic exam grossly normal.  Lymphatics: No bilateral cervical, axillary, supraclavicular  or inguinal adenopathy noted    Lab Results    Recent Results (from the past 168 hour(s))   Comprehensive metabolic panel (BMP + Alb, Alk Phos, ALT, AST, Total. Bili, TP)   Result Value Ref Range    Sodium 142 133 - 144 mmol/L    Potassium 4.5 3.4 - 5.3 mmol/L    Chloride 106 94 - 109 mmol/L    Carbon Dioxide (CO2) 31 20 - 32 mmol/L    Anion Gap 5 3 - 14 mmol/L    Urea Nitrogen 16 7 - 30 mg/dL    Creatinine 0.96 0.52 - 1.04 mg/dL    Calcium 9.4 8.5 - 10.1 mg/dL    Glucose 94 70 - 99 mg/dL    Alkaline Phosphatase 77 40 - 150 U/L    AST 16 0 - 45 U/L    ALT 33 0 - 50 U/L    Protein Total 7.5 6.8 - 8.8 g/dL    Albumin 4.0 3.4 - 5.0 g/dL    Bilirubin Total 1.1 0.2 - 1.3 mg/dL    GFR Estimate 70 >60 mL/min/1.73m2   CBC with platelets and differential   Result Value Ref Range    WBC Count 3.3 (L) 4.0 - 11.0 10e3/uL    RBC Count 4.59 3.80 - 5.20 10e6/uL    Hemoglobin 13.6 11.7 - 15.7 g/dL    Hematocrit 42.5 35.0 - 47.0 %    MCV 93 78 - 100 fL    MCH 29.6 26.5 - 33.0 pg    MCHC 32.0 31.5 - 36.5 g/dL    RDW 14.1 10.0 - 15.0 %    Platelet Count 184 150 - 450 10e3/uL    % Neutrophils 55 %    % Lymphocytes 32 %    % Monocytes 10 %    % Eosinophils 2 %    % Basophils 0 %    Absolute Neutrophils 1.8 1.6 - 8.3 10e3/uL    Absolute Lymphocytes 1.1 0.8 - 5.3 10e3/uL    Absolute Monocytes 0.3 0.0 - 1.3 10e3/uL    Absolute Eosinophils 0.1 0.0 - 0.7 10e3/uL    Absolute Basophils 0.0 0.0 - 0.2 10e3/uL       Imaging    No results found.    A total of 30 min were spent today on this visit which included face to face conversation with the patient, EMR review, counseling and co-ordination of care and medical documentation.    Signed by: Linda Neal MD

## 2022-05-15 ENCOUNTER — HEALTH MAINTENANCE LETTER (OUTPATIENT)
Age: 52
End: 2022-05-15

## 2022-08-10 ENCOUNTER — HOSPITAL ENCOUNTER (OUTPATIENT)
Dept: MAMMOGRAPHY | Facility: CLINIC | Age: 52
Discharge: HOME | End: 2022-08-10
Attending: INTERNAL MEDICINE | Admitting: INTERNAL MEDICINE
Payer: COMMERCIAL

## 2022-08-10 DIAGNOSIS — Z85.3 PERSONAL HISTORY OF MALIGNANT NEOPLASM OF BREAST: ICD-10-CM

## 2022-08-10 PROCEDURE — 77067 SCR MAMMO BI INCL CAD: CPT

## 2022-09-10 ENCOUNTER — HEALTH MAINTENANCE LETTER (OUTPATIENT)
Age: 52
End: 2022-09-10

## 2022-10-10 ENCOUNTER — TRANSFERRED RECORDS (OUTPATIENT)
Dept: HEALTH INFORMATION MANAGEMENT | Facility: CLINIC | Age: 52
End: 2022-10-10

## 2022-10-10 LAB — TSH SERPL-ACNC: 0.33 UIU/ML (ref 0.45–5.33)

## 2022-12-16 ENCOUNTER — HOSPITAL ENCOUNTER (OUTPATIENT)
Dept: BONE DENSITY | Facility: CLINIC | Age: 52
Discharge: HOME | End: 2022-12-16
Attending: INTERNAL MEDICINE | Admitting: INTERNAL MEDICINE
Payer: COMMERCIAL

## 2022-12-16 DIAGNOSIS — Z85.3 PERSONAL HISTORY OF MALIGNANT NEOPLASM OF BREAST: ICD-10-CM

## 2022-12-16 DIAGNOSIS — M85.80 OSTEOPENIA, UNSPECIFIED LOCATION: ICD-10-CM

## 2022-12-16 PROCEDURE — 77080 DXA BONE DENSITY AXIAL: CPT

## 2023-03-08 DIAGNOSIS — Z85.3 PERSONAL HISTORY OF MALIGNANT NEOPLASM OF BREAST: ICD-10-CM

## 2023-03-08 RX ORDER — ANASTROZOLE 1 MG/1
1 TABLET ORAL DAILY
Qty: 90 TABLET | Refills: 3 | Status: SHIPPED | OUTPATIENT
Start: 2023-03-08

## 2023-04-17 ENCOUNTER — ONCOLOGY VISIT (OUTPATIENT)
Dept: ONCOLOGY | Facility: CLINIC | Age: 53
End: 2023-04-17
Attending: NURSE PRACTITIONER
Payer: COMMERCIAL

## 2023-04-17 ENCOUNTER — LAB (OUTPATIENT)
Dept: LAB | Facility: CLINIC | Age: 53
End: 2023-04-17
Payer: COMMERCIAL

## 2023-04-17 VITALS
RESPIRATION RATE: 16 BRPM | BODY MASS INDEX: 26.44 KG/M2 | TEMPERATURE: 98.6 F | OXYGEN SATURATION: 99 % | DIASTOLIC BLOOD PRESSURE: 76 MMHG | HEART RATE: 79 BPM | SYSTOLIC BLOOD PRESSURE: 150 MMHG | WEIGHT: 158.9 LBS

## 2023-04-17 DIAGNOSIS — G44.219 EPISODIC TENSION-TYPE HEADACHE, NOT INTRACTABLE: ICD-10-CM

## 2023-04-17 DIAGNOSIS — Z85.3 PERSONAL HISTORY OF MALIGNANT NEOPLASM OF BREAST: Primary | ICD-10-CM

## 2023-04-17 DIAGNOSIS — D72.819 LEUKOPENIA, UNSPECIFIED TYPE: ICD-10-CM

## 2023-04-17 DIAGNOSIS — Z85.3 PERSONAL HISTORY OF MALIGNANT NEOPLASM OF BREAST: ICD-10-CM

## 2023-04-17 DIAGNOSIS — M85.80 OSTEOPENIA, UNSPECIFIED LOCATION: ICD-10-CM

## 2023-04-17 DIAGNOSIS — Z12.31 ENCOUNTER FOR SCREENING MAMMOGRAM FOR BREAST CANCER: ICD-10-CM

## 2023-04-17 LAB
BASOPHILS # BLD AUTO: 0 10E3/UL (ref 0–0.2)
BASOPHILS NFR BLD AUTO: 0 %
EOSINOPHIL # BLD AUTO: 0.1 10E3/UL (ref 0–0.7)
EOSINOPHIL NFR BLD AUTO: 1 %
ERYTHROCYTE [DISTWIDTH] IN BLOOD BY AUTOMATED COUNT: 14 % (ref 10–15)
HCT VFR BLD AUTO: 41.5 % (ref 35–47)
HGB BLD-MCNC: 13.7 G/DL (ref 11.7–15.7)
IMM GRANULOCYTES # BLD: 0 10E3/UL
IMM GRANULOCYTES NFR BLD: 0 %
LYMPHOCYTES # BLD AUTO: 1.4 10E3/UL (ref 0.8–5.3)
LYMPHOCYTES NFR BLD AUTO: 25 %
MCH RBC QN AUTO: 29.5 PG (ref 26.5–33)
MCHC RBC AUTO-ENTMCNC: 33 G/DL (ref 31.5–36.5)
MCV RBC AUTO: 89 FL (ref 78–100)
MONOCYTES # BLD AUTO: 0.3 10E3/UL (ref 0–1.3)
MONOCYTES NFR BLD AUTO: 6 %
NEUTROPHILS # BLD AUTO: 4 10E3/UL (ref 1.6–8.3)
NEUTROPHILS NFR BLD AUTO: 68 %
NRBC # BLD AUTO: 0 10E3/UL
NRBC BLD AUTO-RTO: 0 /100
PLATELET # BLD AUTO: 230 10E3/UL (ref 150–450)
RBC # BLD AUTO: 4.65 10E6/UL (ref 3.8–5.2)
WBC # BLD AUTO: 5.8 10E3/UL (ref 4–11)

## 2023-04-17 PROCEDURE — 99213 OFFICE O/P EST LOW 20 MIN: CPT | Performed by: NURSE PRACTITIONER

## 2023-04-17 PROCEDURE — 85004 AUTOMATED DIFF WBC COUNT: CPT

## 2023-04-17 PROCEDURE — 36415 COLL VENOUS BLD VENIPUNCTURE: CPT

## 2023-04-17 PROCEDURE — 99214 OFFICE O/P EST MOD 30 MIN: CPT | Performed by: NURSE PRACTITIONER

## 2023-04-17 ASSESSMENT — PAIN SCALES - GENERAL: PAINLEVEL: NO PAIN (0)

## 2023-04-17 NOTE — LETTER
4/17/2023         RE: Lisset Lopes  [address] [address]  [facility] [address]        Dear Colleague,    Thank you for referring your patient, Lisset Lopes, to the [facility] [place]. Please see a copy of my visit note below.    [facility] Hematology and Oncology Progress Note    Patient: Lisset Lopes  MRN: [id number]  Date of Service: Apr 17, 2023          Reason for Visit    Left breast cancer    Primary Oncologist: Dr. Neal      Assessment and Plan    #  Early stage breast cancer, stage IIB, status post lumpectomy, radiation, adjuvant chemotherapy with ACT, currently on endocrine therapy  Lisset is over 9.5 yr from her diagnosis.  She's been on adjuvant hormone therapy about 9.5 yrs (the last 7 yrs on AI). Due to multifocal nature, dion involvement and Oncotype score (22), planning 10 yrs.     Doing well on Arimidex.  No major side effect so far.      Mammogram 8/2022 benign  Clinically, no concern for cancer recurrence.     Plan:  -Mammogram due in August (ordered)  -Continue anastrozole until 12/2013, which will complete 10 yrs of endocrine therapy and will stop  -Can transition to routine screening through primary care after today's visit.     #  New headaches  Sound to be tension and intermittent. Discussed option of brain MRI to rule out cancer mets (low likelihood) or monitor a bit longer. She prefers the latter.    Plan:  -Monitor and call us if persistent for another 4-6 weeks or increasing in severity, would then order brain MRI    #  Bone Health  DEXA shows some decline in bone density since 2020 (7.5% in L spine and 4.4% in bilateral hip) but with lowest T socre -1.8 in left hip so remains in osteopenic range (10-yr risk of major osteporotic fx 7% and hip fracture 0.7%).     Plan:  -Continue calcium and vitamin D   -Weight bearing exercises  -No role for biphosphonate at this time, especially since we will be stopping AI later  this year  -Follow DEXA every 2-3 yrs with  PCP    #  Chronic mild leukopenia  Since her chemo in 2013. Has been stable.    Plan:  -Updated CBC today is WNL  -Follow every 1-2 yrs with routine primary care       Cancer Staging   Malignant neoplasm of upper-outer quadrant of female breast (H)  Staging form: Breast, AJCC 7th Edition  - Pathologic: Stage IIB (T2(2), N1a, cM0) - Signed by Linda Neal MD on 4/11/2022  ER Status: Positive  CA Status: Positive  HER2 Status: Negative      ECOG Performance    0 - Independent         Problem List    Patient Active Problem List   Diagnosis     Malignant neoplasm (H)     PE (pulmonary embolism)     Malignant neoplasm of upper-outer quadrant of female breast (H)        Oncology history  7/2013: [id number] Left breast cancer, ER/CA+  -presented age 43 with mammogram noting heterogeneous breast and spiculated appearing mass + microcalcification. ([facility])    -US/Biopsy: left breast 1: 00 o'clock, 2 cm from nipple fibroadenoma, 1 o'clock 10 cm from the nipple, invasive ductal cancer, grade 1, ER/CA 75% to 90% positive, HER-2/jose juan was negative, associated with DCIS, grade 1, without necrosis.    -surgical path: multifocal invasive ductal carcinoma with tubular features predominantly low nuclear grade 1; largest focus of invasive cancer 2.4 cm and second focus measured 1.4 cm, DCIS, low-grade, cribriform and micropapillary features, without necrosis.  Axillary lymph nodes, sentinel nodes #1 positive, the tumor focus measured 5 mm and has extranodal extension measures less than 1 mm.  Left axillary sentinel nodes #2 was negative.  Margin was negative.  She has pathologic T2 N1a invasive ductal cancer, multifocal disease, left breast cancer.   -no mets on staging  -Oncotype RS was 22.      Treatment:  -10/3/2013: left lumpectomy and sLN resection  -11/2013 - 4/2014: completed adjuvant DD AC then wkly taxol (in [place])  -7/2014: adjuvant RT   -12/2013:  Tamoxifen(pre-menopausal).   -4/2016: changed to Arimidex (post-menopausal). Due to ongoing severe hot flushes and new joints pain, advice try Aromasin in 10/2018. Then back to Arimidex due to cost.     Interval History   Lisset Lopes is a 52 year old with early stage breast cancer s/p lumpectomy followed by chemotherapy and radiation.  Currently on hormonal therapy with Arimidex (on endocrine therapy total 9.5 yrs thus far) who is here for 1 yr follow-up.    Doing well on treatment.  Denies any significant side effects.      Is active.  Weight down 5-10 lb over the year, good appetite.  No breast changes.  No new pain.   3 months new onset intermittent frontal/right jaw headaches, started while in [place]. She is under some stress caring for her father. No dental concerns. No other neuro symptoms.      Physical Exam    General: alert and cooperative  HEENT: Head: Normal, normocephalic, atraumatic.  Eye: Normal external eye, conjunctiva, lids cornea, KIMBERLY.  Lymphatics: No bilateral cervical, axillary, supraclavicular adenopathy noted  Breasts: No palpable masses. Both nipples inverted, per baseline.   Chest: Clear to auscultation bilaterally  Cardiac: RRR  Abdomen: abdomen is soft without significant tenderness, masses, organomegaly or guarding  Extremities: atraumatic, no peripheral edema  Skin: No rash  CNS: Alert and oriented x3, neurologic exam grossly normal.      Lab Results    Recent Results (from the past 168 hour(s))   CBC with platelets and differential   Result Value Ref Range    WBC Count 5.8 4.0 - 11.0 10e3/uL    RBC Count 4.65 3.80 - 5.20 10e6/uL    Hemoglobin 13.7 11.7 - 15.7 g/dL    Hematocrit 41.5 35.0 - 47.0 %    MCV 89 78 - 100 fL    MCH 29.5 26.5 - 33.0 pg    MCHC 33.0 31.5 - 36.5 g/dL    RDW 14.0 10.0 - 15.0 %    Platelet Count 230 150 - 450 10e3/uL    % Neutrophils 68 %    % Lymphocytes 25 %    % Monocytes 6 %    % Eosinophils 1 %    % Basophils 0 %    % Immature Granulocytes 0 %     "NRBCs per 100 WBC 0 <1 /100    Absolute Neutrophils 4.0 1.6 - 8.3 10e3/uL    Absolute Lymphocytes 1.4 0.8 - 5.3 10e3/uL    Absolute Monocytes 0.3 0.0 - 1.3 10e3/uL    Absolute Eosinophils 0.1 0.0 - 0.7 10e3/uL    Absolute Basophils 0.0 0.0 - 0.2 10e3/uL    Absolute Immature Granulocytes 0.0 <=0.4 10e3/uL    Absolute NRBCs 0.0 10e3/uL       Imaging    No results found.    A total of 35 min were spent today on this visit which included face to face conversation with the patient, EMR review, counseling and co-ordination of care and medical documentation.    Signed by: Edita Ramos NP    Oncology Rooming Note    April 17, 2023 11:32 AM   Lisset Lopes is a 52 year old female who presents for:    Chief Complaint   Patient presents with     Oncology Clinic Visit     Malignant neoplasm of upper-outer quadrant of female breast - Provider visit     Initial Vitals: BP (!) 150/76 (BP Location: Right arm, Patient Position: Sitting, Cuff Size: Adult Regular)   Pulse 79   Temp 98.6  F (37  C) (Tympanic)   Resp 16   Wt 72.1 kg (158 lb 14.4 oz)   SpO2 99%   BMI 26.44 kg/m   Estimated body mass index is 26.44 kg/m  as calculated from the following:    Height as of 4/22/19: 1.651 m (5' 5\").    Weight as of this encounter: 72.1 kg (158 lb 14.4 oz). Body surface area is 1.82 meters squared.  No Pain (0) Comment: Data Unavailable   No LMP recorded. (Menstrual status: Irregular Periods).  Allergies reviewed: Yes  Medications reviewed: Yes    Medications: Medication refills not needed today.  Pharmacy name entered into EPIC: [id number] - [place], [place] - [address]    Clinical concerns:  None      Selina Diaz CMA                Again, thank you for allowing me to participate in the care of your patient.        Sincerely,        Edita Ramos NP    "

## 2023-04-17 NOTE — PROGRESS NOTES
[facility] Hematology and Oncology Progress Note    Patient: Lisset Lopes  MRN: [id number]  Date of Service: Apr 17, 2023          Reason for Visit    Left breast cancer    Primary Oncologist: Dr. Neal      Assessment and Plan    #  Early stage breast cancer, stage IIB, status post lumpectomy, radiation, adjuvant chemotherapy with ACT, currently on endocrine therapy  Lisset is over 9.5 yr from her diagnosis.  She's been on adjuvant hormone therapy about 9.5 yrs (the last 7 yrs on AI). Due to multifocal nature, dion involvement and Oncotype score (22), planning 10 yrs.     Doing well on Arimidex.  No major side effect so far.      Mammogram 8/2022 benign  Clinically, no concern for cancer recurrence.     Plan:  -Mammogram due in August (ordered)  -Continue anastrozole until 12/2013, which will complete 10 yrs of endocrine therapy and will stop  -Can transition to routine screening through primary care after today's visit.     #  New headaches  Sound to be tension and intermittent. Discussed option of brain MRI to rule out cancer mets (low likelihood) or monitor a bit longer. She prefers the latter.    Plan:  -Monitor and call us if persistent for another 4-6 weeks or increasing in severity, would then order brain MRI    #  Bone Health  DEXA shows some decline in bone density since 2020 (7.5% in L spine and 4.4% in bilateral hip) but with lowest T socre -1.8 in left hip so remains in osteopenic range (10-yr risk of major osteporotic fx 7% and hip fracture 0.7%).     Plan:  -Continue calcium and vitamin D   -Weight bearing exercises  -No role for biphosphonate at this time, especially since we will be stopping AI later this year  -Follow DEXA every 2-3 yrs with  PCP    #  Chronic mild leukopenia  Since her chemo in 2013. Has been stable.    Plan:  -Updated CBC today is WNL  -Follow every 1-2 yrs with routine primary care       Cancer Staging   Malignant neoplasm of upper-outer quadrant of female  breast (H)  Staging form: Breast, AJCC 7th Edition  - Pathologic: Stage IIB (T2(2), N1a, cM0) - Signed by Linda Neal MD on 4/11/2022  ER Status: Positive  MA Status: Positive  HER2 Status: Negative      ECOG Performance    0 - Independent         Problem List    Patient Active Problem List   Diagnosis     Malignant neoplasm (H)     PE (pulmonary embolism)     Malignant neoplasm of upper-outer quadrant of female breast (H)        Oncology history  7/2013: [id number] Left breast cancer, ER/MA+  -presented age 43 with mammogram noting heterogeneous breast and spiculated appearing mass + microcalcification. ([facility])    -US/Biopsy: left breast 1: 00 o'clock, 2 cm from nipple fibroadenoma, 1 o'clock 10 cm from the nipple, invasive ductal cancer, grade 1, ER/MA 75% to 90% positive, HER-2/jose juan was negative, associated with DCIS, grade 1, without necrosis.    -surgical path: multifocal invasive ductal carcinoma with tubular features predominantly low nuclear grade 1; largest focus of invasive cancer 2.4 cm and second focus measured 1.4 cm, DCIS, low-grade, cribriform and micropapillary features, without necrosis.  Axillary lymph nodes, sentinel nodes #1 positive, the tumor focus measured 5 mm and has extranodal extension measures less than 1 mm.  Left axillary sentinel nodes #2 was negative.  Margin was negative.  She has pathologic T2 N1a invasive ductal cancer, multifocal disease, left breast cancer.   -no mets on staging  -Oncotype RS was 22.      Treatment:  -10/3/2013: left lumpectomy and sLN resection  -11/2013 - 4/2014: completed adjuvant DD AC then wkly taxol (in [place])  -7/2014: adjuvant RT   -12/2013: Tamoxifen(pre-menopausal).   -4/2016: changed to Arimidex (post-menopausal). Due to ongoing severe hot flushes and new joints pain, advice try Aromasin in 10/2018. Then back to Arimidex due to cost.     Interval History   Lisset Lopes is a 52 year old with early stage  breast cancer s/p lumpectomy followed by chemotherapy and radiation.  Currently on hormonal therapy with Arimidex (on endocrine therapy total 9.5 yrs thus far) who is here for 1 yr follow-up.    Doing well on treatment.  Denies any significant side effects.      Is active.  Weight down 5-10 lb over the year, good appetite.  No breast changes.  No new pain.   3 months new onset intermittent frontal/right jaw headaches, started while in [place]. She is under some stress caring for her father. No dental concerns. No other neuro symptoms.      Physical Exam    General: alert and cooperative  HEENT: Head: Normal, normocephalic, atraumatic.  Eye: Normal external eye, conjunctiva, lids cornea, KIMBERLY.  Lymphatics: No bilateral cervical, axillary, supraclavicular adenopathy noted  Breasts: No palpable masses. Both nipples inverted, per baseline.   Chest: Clear to auscultation bilaterally  Cardiac: RRR  Abdomen: abdomen is soft without significant tenderness, masses, organomegaly or guarding  Extremities: atraumatic, no peripheral edema  Skin: No rash  CNS: Alert and oriented x3, neurologic exam grossly normal.      Lab Results    Recent Results (from the past 168 hour(s))   CBC with platelets and differential   Result Value Ref Range    WBC Count 5.8 4.0 - 11.0 10e3/uL    RBC Count 4.65 3.80 - 5.20 10e6/uL    Hemoglobin 13.7 11.7 - 15.7 g/dL    Hematocrit 41.5 35.0 - 47.0 %    MCV 89 78 - 100 fL    MCH 29.5 26.5 - 33.0 pg    MCHC 33.0 31.5 - 36.5 g/dL    RDW 14.0 10.0 - 15.0 %    Platelet Count 230 150 - 450 10e3/uL    % Neutrophils 68 %    % Lymphocytes 25 %    % Monocytes 6 %    % Eosinophils 1 %    % Basophils 0 %    % Immature Granulocytes 0 %    NRBCs per 100 WBC 0 <1 /100    Absolute Neutrophils 4.0 1.6 - 8.3 10e3/uL    Absolute Lymphocytes 1.4 0.8 - 5.3 10e3/uL    Absolute Monocytes 0.3 0.0 - 1.3 10e3/uL    Absolute Eosinophils 0.1 0.0 - 0.7 10e3/uL    Absolute Basophils 0.0 0.0 - 0.2 10e3/uL    Absolute Immature  Granulocytes 0.0 <=0.4 10e3/uL    Absolute NRBCs 0.0 10e3/uL       Imaging    No results found.    A total of 35 min were spent today on this visit which included face to face conversation with the patient, EMR review, counseling and co-ordination of care and medical documentation.    Signed by: Edita Ramos NP

## 2023-04-17 NOTE — PROGRESS NOTES
"Oncology Rooming Note    April 17, 2023 11:32 AM   Lisset Lopes is a 52 year old female who presents for:    Chief Complaint   Patient presents with     Oncology Clinic Visit     Malignant neoplasm of upper-outer quadrant of female breast - Provider visit     Initial Vitals: BP (!) 150/76 (BP Location: Right arm, Patient Position: Sitting, Cuff Size: Adult Regular)   Pulse 79   Temp 98.6  F (37  C) (Tympanic)   Resp 16   Wt 72.1 kg (158 lb 14.4 oz)   SpO2 99%   BMI 26.44 kg/m   Estimated body mass index is 26.44 kg/m  as calculated from the following:    Height as of 4/22/19: 1.651 m (5' 5\").    Weight as of this encounter: 72.1 kg (158 lb 14.4 oz). Body surface area is 1.82 meters squared.  No Pain (0) Comment: Data Unavailable   No LMP recorded. (Menstrual status: Irregular Periods).  Allergies reviewed: Yes  Medications reviewed: Yes    Medications: Medication refills not needed today.  Pharmacy name entered into EPIC: [id number] - [place], [place] - [address]    Clinical concerns:  None      Selina Diaz, FITO            "

## 2023-04-18 ENCOUNTER — TRANSFERRED RECORDS (OUTPATIENT)
Dept: HEALTH INFORMATION MANAGEMENT | Facility: CLINIC | Age: 53
End: 2023-04-18
Payer: COMMERCIAL

## 2023-04-18 LAB — TSH SERPL-ACNC: 3.65 UIU/ML (ref 0.45–5.33)

## 2023-04-25 ENCOUNTER — TRANSFERRED RECORDS (OUTPATIENT)
Dept: HEALTH INFORMATION MANAGEMENT | Facility: CLINIC | Age: 53
End: 2023-04-25
Payer: COMMERCIAL

## 2023-05-03 ENCOUNTER — MEDICAL CORRESPONDENCE (OUTPATIENT)
Dept: HEALTH INFORMATION MANAGEMENT | Facility: CLINIC | Age: 53
End: 2023-05-03
Payer: COMMERCIAL

## 2023-05-04 ENCOUNTER — TRANSCRIBE ORDERS (OUTPATIENT)
Dept: OTHER | Age: 53
End: 2023-05-04

## 2023-05-04 DIAGNOSIS — E03.8 OTHER SPECIFIED HYPOTHYROIDISM: Primary | ICD-10-CM

## 2023-06-03 ENCOUNTER — HEALTH MAINTENANCE LETTER (OUTPATIENT)
Age: 53
End: 2023-06-03

## 2023-07-25 NOTE — MR AVS SNAPSHOT
After Visit Summary   10/16/2017    Lisset Lopes    MRN: [id number]           Patient Information     Date Of Birth          1970        Visit Information        Provider Department      10/16/2017 11:15 AM Clara Perry MD [facility]        Today's Diagnoses     Malignant neoplasm of upper-outer quadrant of left breast in female, estrogen receptor positive (H)    -  1    Hot flushes, perimenopausal        Menopause        Osteopenia, unspecified location          Care Instructions    Obtain dexa result from MD chandra fall 2016.  Try dexa now.   6 months f/u with labs.          Follow-ups after your visit        Your next 10 appointments already scheduled     Apr 09, 2018 10:30 AM CDT   LAB with [facility]   [facility] ([facility])    [address] [id number] [phone]           Patient must bring picture ID. Patient should be prepared to give a urine specimen  Please do not eat 10-12 hours before your appointment if you are coming in fasting for labs on lipids, cholesterol, or glucose (sugar). Pregnant women should follow their Care Team instructions. Water with medications is okay. Do not drink coffee or other fluids. If you have concerns about taking  your medications, please ask at office or if scheduling via [employer], send a message by clicking on Secure Messaging, Message Your Care Team.            Apr 16, 2018 11:30 AM CDT   Return Visit with [facility] ([facility])    [facility]  [address] [address]   [phone]              Future tests that were ordered for you today     Open Future Orders        Priority Expected Expires Ordered    DX Hip/Pelvis/Spine Routine  10/16/2018 10/16/2017            Who to contact     If you have questions or need follow up information about today's clinic visit or your schedule please contact [facility] directly at [phone].  Normal or non-critical lab and imaging results will be communicated to you by MyChart, letter or phone within 4 business days after the clinic has received the results. If you do not hear from us within 7 days, please contact the clinic through [employer] or phone. If you have a critical or abnormal lab result, we will notify you by phone as soon as possible.  Submit refill requests through [employer] or call your pharmacy and they will forward the refill request to us. Please allow 3 business days for your refill to be completed.          Additional Information About Your Visit        [employer] [place] Information     [employer] gives you secure access to your electronic health record. If you see a primary care provider, you can also send messages to your care team and make appointments. If you have questions, please call your primary care clinic.  If you do not have a primary care provider, please call [phone] and they will assist you.        Care EveryWhere ID     This is your Care EveryWhere ID. This could be used by other organizations to access your [place] medical records  [id number]        Your Vitals Were     Pulse Temperature Respirations Height BMI (Body Mass Index)       76 97.8  F (36.6  C) (Tympanic) 14 1.651 m (5' 5\") 28.36 kg/m2        Blood Pressure from Last 3 Encounters:   10/16/17 145/85   04/24/17 134/75   10/10/16 130/89    Weight from Last 3 Encounters:   10/16/17 77.3 kg (170 lb 6.4 oz)   04/24/17 77.1 kg (170 lb)   10/10/16 73.1 kg (161 lb 1.6 oz)               Primary Care Provider Office Phone # Fax #    Estevan Reynoso [phone]       [facility] [address] [place] [address] [address] [place] [address]        Equal Access to Services     JIMMY COWART AH: Hadii aad ku hadasho Sofannyali, waaxda luqadaha, qaybta kaalmada adeegyada, colby tanner. [facility] [phone].    ATENCIÓN: Si habla español, tiene a molina disposición " servicios gratuitos de asistencia lingüística. Gabriel benavides [phone].    We comply with applicable federal civil rights laws and [place] laws. We do not discriminate on the basis of race, color, national origin, age, disability, sex, sexual orientation, or gender identity.            Thank you!     Thank you for choosing [facility]  for your care. Our goal is always to provide you with excellent care. Hearing back from our patients is one way we can continue to improve our services. Please take a few minutes to complete the written survey that you may receive in the mail after your visit with us. Thank you!             Your Updated Medication List - Protect others around you: Learn how to safely use, store and throw away your medicines at www.disposemymeds.org.          This list is accurate as of: 10/16/17 11:46 AM.  Always use your most recent med list.                   Brand Name Dispense Instructions for use Diagnosis    anastrozole 1 MG tablet    ARIMIDEX    90 tablet    Take 1 tablet (1 mg) by mouth daily    Personal history of malignant neoplasm of breast       * ARMOUR THYROID 15 MG Tabs tablet   Generic drug:  thyroid      TAKE 1 TABLET BY MOUTH ONE TIME DAILY ON AN EMPTY STOMACH        * ARMOUR THYROID 60 MG tablet   Generic drug:  thyroid      Take 60 mg by mouth daily With the 15 mg [place] Thyroid for a total dose of 75 mg daily        * Notice:  This list has 2 medication(s) that are the same as other medications prescribed for you. Read the directions carefully, and ask your doctor or other care provider to review them with you.       yes

## 2023-08-11 ENCOUNTER — HOSPITAL ENCOUNTER (OUTPATIENT)
Dept: MAMMOGRAPHY | Facility: CLINIC | Age: 53
Discharge: HOME | End: 2023-08-11
Attending: NURSE PRACTITIONER | Admitting: NURSE PRACTITIONER
Payer: COMMERCIAL

## 2023-08-11 DIAGNOSIS — Z12.31 ENCOUNTER FOR SCREENING MAMMOGRAM FOR BREAST CANCER: ICD-10-CM

## 2023-08-11 PROCEDURE — 77067 SCR MAMMO BI INCL CAD: CPT

## 2024-07-07 ENCOUNTER — HEALTH MAINTENANCE LETTER (OUTPATIENT)
Age: 54
End: 2024-07-07

## 2024-08-13 ENCOUNTER — HOSPITAL ENCOUNTER (OUTPATIENT)
Dept: MAMMOGRAPHY | Facility: CLINIC | Age: 54
Discharge: HOME | End: 2024-08-13
Attending: FAMILY MEDICINE | Admitting: FAMILY MEDICINE
Payer: COMMERCIAL

## 2024-08-13 DIAGNOSIS — Z12.31 VISIT FOR SCREENING MAMMOGRAM: ICD-10-CM

## 2024-08-13 PROCEDURE — 77063 BREAST TOMOSYNTHESIS BI: CPT

## 2024-10-02 ENCOUNTER — TRANSFERRED RECORDS (OUTPATIENT)
Dept: HEALTH INFORMATION MANAGEMENT | Facility: CLINIC | Age: 54
End: 2024-10-02

## 2024-10-07 ENCOUNTER — PATIENT OUTREACH (OUTPATIENT)
Dept: ONCOLOGY | Facility: CLINIC | Age: 54
End: 2024-10-07
Payer: COMMERCIAL

## 2024-10-07 NOTE — PROGRESS NOTES
[facility]: Cancer Care                                                                                          Enrollment status: graduated    Removed RNVALDEZ Wild from pt care team. Pt following with PCP.    Signature:  ANDREA JACOBSEN RN

## 2024-11-18 ENCOUNTER — TRANSFERRED RECORDS (OUTPATIENT)
Dept: HEALTH INFORMATION MANAGEMENT | Facility: CLINIC | Age: 54
End: 2024-11-18
Payer: COMMERCIAL

## 2024-11-20 ENCOUNTER — MEDICAL CORRESPONDENCE (OUTPATIENT)
Dept: HEALTH INFORMATION MANAGEMENT | Facility: CLINIC | Age: 54
End: 2024-11-20
Payer: COMMERCIAL

## 2024-11-21 ENCOUNTER — TRANSCRIBE ORDERS (OUTPATIENT)
Dept: OTHER | Age: 54
End: 2024-11-21

## 2024-11-21 DIAGNOSIS — Z82.49 FAMILY HISTORY OF ISCHEMIC HEART DISEASE: Primary | ICD-10-CM

## 2025-03-24 PROBLEM — E03.9 HYPOTHYROIDISM, UNSPECIFIED: Status: ACTIVE | Noted: 2025-03-24

## 2025-04-06 NOTE — PROGRESS NOTES
Reason for Consultation: Family history of coronary artery disease.      History of Presenting Illness: This is a 54-year-old female with a past medical history significant for hypothyroidism who was referred due to a family history of coronary artery disease.    She has dyslipidemia and an elevated LP(a) which was measured at 148 in the past.  CRP was normal, but LDL was 172 mg/dL.  She has not been on statin therapy.    She has a significant family history of coronary artery disease, with her mother passing away in her 50s although this was sudden and no autopsy was performed.  Her sister  of a myocardial infarction in her 50s, and she has a maternal uncle who also passed away in his 50s from a myocardial infarct.  There is a family history of diabetes.    From a personal standpoint, she is asymptomatic and is quite active without exertional chest discomfort, palpitations, syncope or presyncope.  She denies any PND orthopnea.  There is no history of tobacco use.     She does have a history of breast cancer treated with mastectomy and chemo/radiation in .  She also has a history of hypothyroidism as a consequence of Hashimoto's disease.    PMH, FH, SH, Allergies and Meds: As outlined below.    Physical Exam:    Gen: NAD  Respiratory : Clear to Auscultation.  Cardiovascular: RRR, no murmurs  Extremities: No edema.          EKG: EKG obtained today was independently reviewed and interpreted and demonstrated sinus rhythm with no acute ST-T wave abnormalities.    Labs: Apo B was 121 on 10/15/2024.  CRP was 1.23.  A1c was 5.4.    Other Cardiac Testing:    She underwent a MUGA scan in 2013 which demonstrated normal left ventricular systolic function.      IMPRESSION:    Dyslipidemia characterized by an elevated LDL and elevated LP(a).  Most likely familial.  2.  Strong family history of coronary disease.  3.  History of hypothyroidism.          PLAN:    Ms. Lopes presents for an evaluation  regarding dyslipidemia in the context of a strong family history of coronary artery disease as described above.    We discussed the methodology for risk assessment in the context of LP(a) and LDL levels and her family history.  I do think that further evaluation for underlying coronary artery disease would be appropriate and have placed an order for a coronary artery calcium scoring study to this effect.    Regardless of her coronary artery calcium scoring results, aggressive lipid-lowering is warranted given her elevated LP(a) in conjunction with other risk factors.  I would probably recommend both statin and ezetimibe therapy but we will await the results of her lipid panel from today before making a recommendation.    Given that elevated LP(a) can also be associated with premature calcific aortic stenosis I will order an echocardiogram for an assessment in that regard.    It was a pleasure seeing her in consultation today.  We did discuss the fact that hypothyroidism can also influence LDL levels and this needs to be monitored closely.    The longitudinal plan of care for the diagnosis(es)/condition(s) as documented were addressed during this visit. Due to the added complexity in care, I will continue to support Lisset in the subsequent management and with ongoing continuity of care.            Heide Sandra M.D.       CURRENT MEDICATIONS:  Current Outpatient Medications   Medication Sig Dispense Refill    anastrozole (ARIMIDEX) 1 MG tablet Take 1 tablet (1 mg) by mouth daily 90 tablet 3    ARMOUR THYROID 60 MG tablet Take 60 mg by mouth daily With the 15 mg [place] Thyroid for a total dose of 75 mg daily  2    Cholecalciferol (VITAMIN D3) 1000 units CAPS       levothyroxine (SYNTHROID/LEVOTHROID) 88 MCG tablet TAKE 1 TABLET BY MOUTH EVERY MORNING ON AN EMPTY STOMACH      Pyridoxine HCl (VITAMIN B6) 200 MG TABS  (Patient not taking: Reported on 4/11/2022)      vitamin E (TOCOPHEROL) 400 units (180 mg) capsule Take  400 Units by mouth (Patient not taking: Reported on 4/11/2022)         ALLERGIES     Allergies   Allergen Reactions    Nkda [No Known Drug Allergy]        PAST MEDICAL HISTORY:  Past Medical History:   Diagnosis Date    Breast cancer (H) 2013    Left    Malignant neoplasm (H)     New dx left breast cancer    Need for prophylactic hormone replacement therapy (postmenopausal)     arimadex    No pertinent past medical history        PAST SURGICAL HISTORY:  Past Surgical History:   Procedure Laterality Date    BIOPSY BREAST Bilateral     INSERT PORT VASCULAR ACCESS  11/21/2013    Procedure: INSERT PORT VASCULAR ACCESS;  Port-a-Cath Placement;  Surgeon: Bob Hill MD;  Location: [facility]    LUMPECTOMY BREAST Left     no surgical history[         FAMILY HISTORY:  Family History   Problem Relation Age of Onset    Diabetes Mother     Asthma Mother     Hypertension Mother     Allergies Mother     Gastrointestinal Disease Brother         intestinal problem    Gastrointestinal Disease Sister         liver problems 60 percent of liver removed    Cancer No family hx of        SOCIAL HISTORY:  Social History     Socioeconomic History    Marital status:    Tobacco Use    Smoking status: Never    Smokeless tobacco: Never   Substance and Sexual Activity    Alcohol use: Yes     Comment: socially    Drug use: No    Sexual activity: Yes     Partners: Male       Review of Systems:  Skin:          Eyes:         ENT:         Respiratory:          Cardiovascular:         Gastroenterology:        Genitourinary:         Musculoskeletal:         Neurologic:         Psychiatric:         Heme/Lymph/Imm:         Endocrine:           Physical Exam:  Vitals: There were no vitals taken for this visit.    Constitutional:           Skin:             Head:           Eyes:           Lymph:      ENT:           Neck:           Respiratory:            Cardiac:                                                           GI:           Extremities  and Muscular Skeletal:                 Neurological:           Psych:           CC  Estevan Reynoso MD  [address]  [place] [address]

## 2025-04-09 ENCOUNTER — OFFICE VISIT (OUTPATIENT)
Dept: CARDIOLOGY | Facility: CLINIC | Age: 55
End: 2025-04-09
Payer: COMMERCIAL

## 2025-04-09 VITALS
DIASTOLIC BLOOD PRESSURE: 72 MMHG | HEIGHT: 65 IN | WEIGHT: 158.3 LBS | BODY MASS INDEX: 26.37 KG/M2 | SYSTOLIC BLOOD PRESSURE: 145 MMHG | HEART RATE: 73 BPM | RESPIRATION RATE: 16 BRPM | OXYGEN SATURATION: 98 %

## 2025-04-09 DIAGNOSIS — Z82.49 FAMILY HISTORY OF ISCHEMIC HEART DISEASE: ICD-10-CM

## 2025-04-09 LAB
ALT SERPL W P-5'-P-CCNC: 28 U/L (ref 0–50)
CHOLEST SERPL-MCNC: 252 MG/DL
FASTING STATUS PATIENT QL REPORTED: NO
HDLC SERPL-MCNC: 76 MG/DL
LDLC SERPL CALC-MCNC: 148 MG/DL
NONHDLC SERPL-MCNC: 176 MG/DL
TRIGL SERPL-MCNC: 138 MG/DL

## 2025-04-09 RX ORDER — FAMOTIDINE 20 MG
TABLET ORAL
COMMUNITY

## 2025-04-09 RX ORDER — THYROID 15 MG/1
TABLET ORAL
COMMUNITY
Start: 2024-02-01

## 2025-04-09 RX ORDER — THYROID 60 MG/1
TABLET ORAL
COMMUNITY
Start: 2024-02-01

## 2025-04-09 NOTE — LETTER
2025    Estevan Reynoso MD  [address]  [place] [address]    RE: Lisset Tonyhill       Dear Colleague,     I had the pleasure of seeing Lisset Kirkland Moses in the [facility].      Reason for Consultation: Family history of coronary artery disease.      History of Presenting Illness: This is a 54-year-old female with a past medical history significant for hypothyroidism who was referred due to a family history of coronary artery disease.    She has dyslipidemia and an elevated LP(a) which was measured at 148 in the past.  CRP was normal, but LDL was 172 mg/dL.  She has not been on statin therapy.    She has a significant family history of coronary artery disease, with her mother passing away in her 50s although this was sudden and no autopsy was performed.  Her sister  of a myocardial infarction in her 50s, and she has a maternal uncle who also passed away in his 50s from a myocardial infarct.  There is a family history of diabetes.    From a personal standpoint, she is asymptomatic and is quite active without exertional chest discomfort, palpitations, syncope or presyncope.  She denies any PND orthopnea.  There is no history of tobacco use.     She does have a history of breast cancer treated with mastectomy and chemo/radiation in .  She also has a history of hypothyroidism as a consequence of Hashimoto's disease.    PMH, FH, SH, Allergies and Meds: As outlined below.    Physical Exam:    Gen: NAD  Respiratory : Clear to Auscultation.  Cardiovascular: RRR, no murmurs  Extremities: No edema.          EKG: EKG obtained today was independently reviewed and interpreted and demonstrated sinus rhythm with no acute ST-T wave abnormalities.    Labs: Apo B was 121 on 10/15/2024.  CRP was 1.23.  A1c was 5.4.    Other Cardiac Testing:    She underwent a MUGA scan in 2013 which demonstrated normal left ventricular systolic function.      IMPRESSION:    Dyslipidemia  characterized by an elevated LDL and elevated LP(a).  Most likely familial.  2.  Strong family history of coronary disease.  3.  History of hypothyroidism.          PLAN:    Ms. Lopes presents for an evaluation regarding dyslipidemia in the context of a strong family history of coronary artery disease as described above.    We discussed the methodology for risk assessment in the context of LP(a) and LDL levels and her family history.  I do think that further evaluation for underlying coronary artery disease would be appropriate and have placed an order for a coronary artery calcium scoring study to this effect.    Regardless of her coronary artery calcium scoring results, aggressive lipid-lowering is warranted given her elevated LP(a) in conjunction with other risk factors.  I would probably recommend both statin and ezetimibe therapy but we will await the results of her lipid panel from today before making a recommendation.    Given that elevated LP(a) can also be associated with premature calcific aortic stenosis I will order an echocardiogram for an assessment in that regard.    It was a pleasure seeing her in consultation today.  We did discuss the fact that hypothyroidism can also influence LDL levels and this needs to be monitored closely.    The longitudinal plan of care for the diagnosis(es)/condition(s) as documented were addressed during this visit. Due to the added complexity in care, I will continue to support Lisset in the subsequent management and with ongoing continuity of care.            Heide Sandra M.D.       CURRENT MEDICATIONS:  Current Outpatient Medications   Medication Sig Dispense Refill     anastrozole (ARIMIDEX) 1 MG tablet Take 1 tablet (1 mg) by mouth daily 90 tablet 3     ARMOUR THYROID 60 MG tablet Take 60 mg by mouth daily With the 15 mg [place] Thyroid for a total dose of 75 mg daily  2     Cholecalciferol (VITAMIN D3) 1000 units CAPS        levothyroxine (SYNTHROID/LEVOTHROID) 88  MCG tablet TAKE 1 TABLET BY MOUTH EVERY MORNING ON AN EMPTY STOMACH       Pyridoxine HCl (VITAMIN B6) 200 MG TABS  (Patient not taking: Reported on 4/11/2022)       vitamin E (TOCOPHEROL) 400 units (180 mg) capsule Take 400 Units by mouth (Patient not taking: Reported on 4/11/2022)         ALLERGIES     Allergies   Allergen Reactions     Nkda [No Known Drug Allergy]        PAST MEDICAL HISTORY:  Past Medical History:   Diagnosis Date     Breast cancer (H) 2013    Left     Malignant neoplasm (H)     New dx left breast cancer     Need for prophylactic hormone replacement therapy (postmenopausal)     arimadex     No pertinent past medical history        PAST SURGICAL HISTORY:  Past Surgical History:   Procedure Laterality Date     BIOPSY BREAST Bilateral      INSERT PORT VASCULAR ACCESS  11/21/2013    Procedure: INSERT PORT VASCULAR ACCESS;  Port-a-Cath Placement;  Surgeon: Bob Hill MD;  Location: [facility]     LUMPECTOMY BREAST Left      no surgical history[         FAMILY HISTORY:  Family History   Problem Relation Age of Onset     Diabetes Mother      Asthma Mother      Hypertension Mother      Allergies Mother      Gastrointestinal Disease Brother         intestinal problem     Gastrointestinal Disease Sister         liver problems 60 percent of liver removed     Cancer No family hx of        SOCIAL HISTORY:  Social History     Socioeconomic History     Marital status:    Tobacco Use     Smoking status: Never     Smokeless tobacco: Never   Substance and Sexual Activity     Alcohol use: Yes     Comment: socially     Drug use: No     Sexual activity: Yes     Partners: Male       Review of Systems:  Skin:          Eyes:         ENT:         Respiratory:          Cardiovascular:         Gastroenterology:        Genitourinary:         Musculoskeletal:         Neurologic:         Psychiatric:         Heme/Lymph/Imm:         Endocrine:           Physical Exam:  Vitals: There were no vitals taken for this  visit.    Constitutional:           Skin:             Head:           Eyes:           Lymph:      ENT:           Neck:           Respiratory:            Cardiac:                                                           GI:           Extremities and Muscular Skeletal:                 Neurological:           Psych:           CC  Estevan Reynoso MD  [address]  [place] [address]                  Thank you for allowing me to participate in the care of your patient.      Sincerely,     Heide Sandra MD     [facility]  cc:   Estevan Reynoso MD  [address]  [place] [address]

## 2025-05-01 ENCOUNTER — HOSPITAL ENCOUNTER (OUTPATIENT)
Dept: CARDIOLOGY | Facility: CLINIC | Age: 55
Discharge: HOME | End: 2025-05-01
Attending: INTERNAL MEDICINE
Payer: COMMERCIAL

## 2025-05-01 ENCOUNTER — HOSPITAL ENCOUNTER (OUTPATIENT)
Dept: CT IMAGING | Facility: CLINIC | Age: 55
Discharge: HOME | End: 2025-05-01
Attending: INTERNAL MEDICINE
Payer: COMMERCIAL

## 2025-05-01 DIAGNOSIS — Z82.49 FAMILY HISTORY OF ISCHEMIC HEART DISEASE: ICD-10-CM

## 2025-05-01 LAB — LVEF ECHO: NORMAL

## 2025-05-01 PROCEDURE — 75571 CT HRT W/O DYE W/CA TEST: CPT

## 2025-05-01 PROCEDURE — 93306 TTE W/DOPPLER COMPLETE: CPT

## 2025-07-13 ENCOUNTER — HEALTH MAINTENANCE LETTER (OUTPATIENT)
Age: 55
End: 2025-07-13

## 2025-08-15 ENCOUNTER — HOSPITAL ENCOUNTER (OUTPATIENT)
Dept: MAMMOGRAPHY | Facility: CLINIC | Age: 55
Discharge: HOME | End: 2025-08-15
Attending: NURSE PRACTITIONER | Admitting: NURSE PRACTITIONER
Payer: COMMERCIAL

## 2025-08-15 DIAGNOSIS — Z12.31 VISIT FOR SCREENING MAMMOGRAM: ICD-10-CM

## 2025-08-15 DIAGNOSIS — Z12.39 SCREENING FOR BREAST CANCER: ICD-10-CM

## 2025-08-15 PROCEDURE — 77063 BREAST TOMOSYNTHESIS BI: CPT

## 2025-08-19 ENCOUNTER — HOSPITAL ENCOUNTER (OUTPATIENT)
Dept: BONE DENSITY | Facility: CLINIC | Age: 55
Discharge: HOME | End: 2025-08-19
Attending: NURSE PRACTITIONER
Payer: COMMERCIAL

## 2025-08-19 DIAGNOSIS — M85.89 OSTEOPENIA OF MULTIPLE SITES: ICD-10-CM

## 2025-08-19 PROCEDURE — 77080 DXA BONE DENSITY AXIAL: CPT
